# Patient Record
Sex: MALE | Race: WHITE | NOT HISPANIC OR LATINO | Employment: OTHER | URBAN - METROPOLITAN AREA
[De-identification: names, ages, dates, MRNs, and addresses within clinical notes are randomized per-mention and may not be internally consistent; named-entity substitution may affect disease eponyms.]

---

## 2018-05-18 PROBLEM — I10 ESSENTIAL HYPERTENSION: Status: ACTIVE | Noted: 2018-05-18

## 2018-05-18 PROCEDURE — 87088 URINE BACTERIA CULTURE: CPT | Performed by: INTERNAL MEDICINE

## 2018-05-18 PROCEDURE — 87186 SC STD MICRODIL/AGAR DIL: CPT | Performed by: INTERNAL MEDICINE

## 2018-05-18 PROCEDURE — 87086 URINE CULTURE/COLONY COUNT: CPT | Performed by: INTERNAL MEDICINE

## 2018-07-16 PROCEDURE — 87088 URINE BACTERIA CULTURE: CPT | Performed by: FAMILY MEDICINE

## 2018-07-16 PROCEDURE — 87186 SC STD MICRODIL/AGAR DIL: CPT | Performed by: FAMILY MEDICINE

## 2018-07-16 PROCEDURE — 87086 URINE CULTURE/COLONY COUNT: CPT | Performed by: FAMILY MEDICINE

## 2018-08-10 PROCEDURE — 81015 MICROSCOPIC EXAM OF URINE: CPT | Performed by: UROLOGY

## 2018-08-10 PROCEDURE — 87186 SC STD MICRODIL/AGAR DIL: CPT | Performed by: UROLOGY

## 2018-08-10 PROCEDURE — 87086 URINE CULTURE/COLONY COUNT: CPT | Performed by: UROLOGY

## 2018-08-10 PROCEDURE — 87088 URINE BACTERIA CULTURE: CPT | Performed by: UROLOGY

## 2018-12-14 PROCEDURE — 87186 SC STD MICRODIL/AGAR DIL: CPT | Performed by: INTERNAL MEDICINE

## 2018-12-14 PROCEDURE — 87088 URINE BACTERIA CULTURE: CPT | Performed by: INTERNAL MEDICINE

## 2018-12-14 PROCEDURE — 87086 URINE CULTURE/COLONY COUNT: CPT | Performed by: INTERNAL MEDICINE

## 2018-12-14 PROCEDURE — 81001 URINALYSIS AUTO W/SCOPE: CPT | Performed by: INTERNAL MEDICINE

## 2019-01-15 PROCEDURE — 84154 ASSAY OF PSA FREE: CPT | Performed by: UROLOGY

## 2019-01-15 PROCEDURE — 36415 COLL VENOUS BLD VENIPUNCTURE: CPT | Performed by: UROLOGY

## 2019-01-15 PROCEDURE — 84153 ASSAY OF PSA TOTAL: CPT | Performed by: UROLOGY

## 2019-02-22 PROCEDURE — 87186 SC STD MICRODIL/AGAR DIL: CPT | Performed by: INTERNAL MEDICINE

## 2019-02-22 PROCEDURE — 87086 URINE CULTURE/COLONY COUNT: CPT | Performed by: INTERNAL MEDICINE

## 2019-02-22 PROCEDURE — 87088 URINE BACTERIA CULTURE: CPT | Performed by: INTERNAL MEDICINE

## 2019-03-19 PROCEDURE — 81015 MICROSCOPIC EXAM OF URINE: CPT | Performed by: UROLOGY

## 2019-03-19 PROCEDURE — 87086 URINE CULTURE/COLONY COUNT: CPT | Performed by: UROLOGY

## 2019-05-02 PROCEDURE — 87086 URINE CULTURE/COLONY COUNT: CPT | Performed by: UROLOGY

## 2019-05-02 PROCEDURE — 87088 URINE BACTERIA CULTURE: CPT | Performed by: UROLOGY

## 2019-05-02 PROCEDURE — 81001 URINALYSIS AUTO W/SCOPE: CPT | Performed by: UROLOGY

## 2019-05-02 PROCEDURE — 87186 SC STD MICRODIL/AGAR DIL: CPT | Performed by: UROLOGY

## 2019-06-17 PROCEDURE — 87186 SC STD MICRODIL/AGAR DIL: CPT | Performed by: UROLOGY

## 2019-06-17 PROCEDURE — 87086 URINE CULTURE/COLONY COUNT: CPT | Performed by: UROLOGY

## 2019-06-17 PROCEDURE — 87088 URINE BACTERIA CULTURE: CPT | Performed by: UROLOGY

## 2019-06-17 PROCEDURE — 81001 URINALYSIS AUTO W/SCOPE: CPT | Performed by: UROLOGY

## 2019-07-06 PROBLEM — F17.200 SMOKER: Status: ACTIVE | Noted: 2019-07-06

## 2019-07-06 PROBLEM — E78.5 HYPERLIPIDEMIA, UNSPECIFIED HYPERLIPIDEMIA TYPE: Status: ACTIVE | Noted: 2019-07-06

## 2019-07-08 PROCEDURE — 81001 URINALYSIS AUTO W/SCOPE: CPT | Performed by: INTERNAL MEDICINE

## 2024-01-02 ENCOUNTER — HOSPITAL ENCOUNTER (INPATIENT)
Facility: HOSPITAL | Age: 79
LOS: 6 days | Discharge: HOME | End: 2024-01-08
Attending: EMERGENCY MEDICINE | Admitting: STUDENT IN AN ORGANIZED HEALTH CARE EDUCATION/TRAINING PROGRAM
Payer: MEDICARE

## 2024-01-02 ENCOUNTER — APPOINTMENT (OUTPATIENT)
Dept: GENERAL RADIOLOGY | Facility: HOSPITAL | Age: 79
End: 2024-01-02
Payer: MEDICARE

## 2024-01-02 DIAGNOSIS — J18.9 PNEUMONIA OF BOTH LUNGS DUE TO INFECTIOUS ORGANISM, UNSPECIFIED PART OF LUNG: ICD-10-CM

## 2024-01-02 DIAGNOSIS — J44.0 COPD WITH PNEUMONIA: Primary | ICD-10-CM

## 2024-01-02 DIAGNOSIS — T17.800A MULTIPLE TRACHEOBRONCHIAL MUCUS PLUGS: ICD-10-CM

## 2024-01-02 DIAGNOSIS — J18.9 COPD WITH PNEUMONIA: Primary | ICD-10-CM

## 2024-01-02 LAB
ALBUMIN SERPL-MCNC: 3.5 G/DL (ref 3.5–5.2)
ALBUMIN/GLOB SERPL: 0.8 G/DL
ALP SERPL-CCNC: 273 U/L (ref 39–117)
ALT SERPL W P-5'-P-CCNC: 56 U/L (ref 1–41)
ANION GAP SERPL CALCULATED.3IONS-SCNC: 7.8 MMOL/L (ref 5–15)
AST SERPL-CCNC: 37 U/L (ref 1–40)
BASOPHILS # BLD MANUAL: 0.3 10*3/MM3 (ref 0–0.2)
BASOPHILS NFR BLD MANUAL: 1 % (ref 0–1.5)
BILIRUB SERPL-MCNC: 0.7 MG/DL (ref 0–1.2)
BUN SERPL-MCNC: 21 MG/DL (ref 8–23)
BUN/CREAT SERPL: 21.6 (ref 7–25)
CALCIUM SPEC-SCNC: 9 MG/DL (ref 8.6–10.5)
CHLORIDE SERPL-SCNC: 96 MMOL/L (ref 98–107)
CO2 SERPL-SCNC: 33.2 MMOL/L (ref 22–29)
CREAT SERPL-MCNC: 0.97 MG/DL (ref 0.76–1.27)
D-LACTATE SERPL-SCNC: 1.5 MMOL/L (ref 0.5–2)
DEPRECATED RDW RBC AUTO: 48.2 FL (ref 37–54)
EGFRCR SERPLBLD CKD-EPI 2021: 79.9 ML/MIN/1.73
ERYTHROCYTE [DISTWIDTH] IN BLOOD BY AUTOMATED COUNT: 13.8 % (ref 12.3–15.4)
FLUAV SUBTYP SPEC NAA+PROBE: NOT DETECTED
FLUBV RNA ISLT QL NAA+PROBE: NOT DETECTED
GEN 5 2HR TROPONIN T REFLEX: 14 NG/L
GIANT PLATELETS: ABNORMAL
GLOBULIN UR ELPH-MCNC: 4.2 GM/DL
GLUCOSE SERPL-MCNC: 118 MG/DL (ref 65–99)
HCT VFR BLD AUTO: 48.4 % (ref 37.5–51)
HGB BLD-MCNC: 15.4 G/DL (ref 13–17.7)
LYMPHOCYTES # BLD MANUAL: 3.84 10*3/MM3 (ref 0.7–3.1)
LYMPHOCYTES NFR BLD MANUAL: 3 % (ref 5–12)
MCH RBC QN AUTO: 29.9 PG (ref 26.6–33)
MCHC RBC AUTO-ENTMCNC: 31.8 G/DL (ref 31.5–35.7)
MCV RBC AUTO: 94 FL (ref 79–97)
MONOCYTES # BLD: 0.89 10*3/MM3 (ref 0.1–0.9)
MYELOCYTES NFR BLD MANUAL: 1 % (ref 0–0)
NEUTROPHILS # BLD AUTO: 24.19 10*3/MM3 (ref 1.7–7)
NEUTROPHILS NFR BLD MANUAL: 78 % (ref 42.7–76)
NEUTS BAND NFR BLD MANUAL: 4 % (ref 0–5)
NT-PROBNP SERPL-MCNC: 179.3 PG/ML (ref 0–1800)
PLATELET # BLD AUTO: 413 10*3/MM3 (ref 140–450)
PMV BLD AUTO: 12.1 FL (ref 6–12)
POTASSIUM SERPL-SCNC: 3.4 MMOL/L (ref 3.5–5.2)
PROT SERPL-MCNC: 7.7 G/DL (ref 6–8.5)
QT INTERVAL: 374 MS
QTC INTERVAL: 448 MS
RBC # BLD AUTO: 5.15 10*6/MM3 (ref 4.14–5.8)
RBC MORPH BLD: NORMAL
RSV RNA NPH QL NAA+NON-PROBE: NOT DETECTED
SARS-COV-2 RNA RESP QL NAA+PROBE: NOT DETECTED
SODIUM SERPL-SCNC: 137 MMOL/L (ref 136–145)
TROPONIN T DELTA: -1 NG/L
TROPONIN T SERPL HS-MCNC: 15 NG/L
TROPONIN T SERPL HS-MCNC: 15 NG/L
VARIANT LYMPHS NFR BLD MANUAL: 13 % (ref 19.6–45.3)
WBC MORPH BLD: NORMAL
WBC NRBC COR # BLD AUTO: 29.5 10*3/MM3 (ref 3.4–10.8)

## 2024-01-02 PROCEDURE — 25810000003 SODIUM CHLORIDE 0.9 % SOLUTION 250 ML FLEX CONT: Performed by: EMERGENCY MEDICINE

## 2024-01-02 PROCEDURE — 87636 SARSCOV2 & INF A&B AMP PRB: CPT | Performed by: EMERGENCY MEDICINE

## 2024-01-02 PROCEDURE — 80053 COMPREHEN METABOLIC PANEL: CPT | Performed by: EMERGENCY MEDICINE

## 2024-01-02 PROCEDURE — 25010000002 AZITHROMYCIN PER 500 MG: Performed by: EMERGENCY MEDICINE

## 2024-01-02 PROCEDURE — 71045 X-RAY EXAM CHEST 1 VIEW: CPT

## 2024-01-02 PROCEDURE — 93010 ELECTROCARDIOGRAM REPORT: CPT | Performed by: EMERGENCY MEDICINE

## 2024-01-02 PROCEDURE — 25810000003 LACTATED RINGERS PER 1000 ML: Performed by: STUDENT IN AN ORGANIZED HEALTH CARE EDUCATION/TRAINING PROGRAM

## 2024-01-02 PROCEDURE — 93005 ELECTROCARDIOGRAM TRACING: CPT | Performed by: EMERGENCY MEDICINE

## 2024-01-02 PROCEDURE — 87634 RSV DNA/RNA AMP PROBE: CPT | Performed by: EMERGENCY MEDICINE

## 2024-01-02 PROCEDURE — 83880 ASSAY OF NATRIURETIC PEPTIDE: CPT | Performed by: EMERGENCY MEDICINE

## 2024-01-02 PROCEDURE — 83605 ASSAY OF LACTIC ACID: CPT | Performed by: EMERGENCY MEDICINE

## 2024-01-02 PROCEDURE — 25010000002 CEFTRIAXONE PER 250 MG: Performed by: EMERGENCY MEDICINE

## 2024-01-02 PROCEDURE — 87040 BLOOD CULTURE FOR BACTERIA: CPT | Performed by: EMERGENCY MEDICINE

## 2024-01-02 PROCEDURE — 84484 ASSAY OF TROPONIN QUANT: CPT | Performed by: EMERGENCY MEDICINE

## 2024-01-02 PROCEDURE — 85007 BL SMEAR W/DIFF WBC COUNT: CPT | Performed by: EMERGENCY MEDICINE

## 2024-01-02 PROCEDURE — 99285 EMERGENCY DEPT VISIT HI MDM: CPT | Performed by: EMERGENCY MEDICINE

## 2024-01-02 PROCEDURE — 85025 COMPLETE CBC W/AUTO DIFF WBC: CPT | Performed by: EMERGENCY MEDICINE

## 2024-01-02 PROCEDURE — 25010000002 CEFTRIAXONE PER 250 MG: Performed by: STUDENT IN AN ORGANIZED HEALTH CARE EDUCATION/TRAINING PROGRAM

## 2024-01-02 PROCEDURE — 36415 COLL VENOUS BLD VENIPUNCTURE: CPT

## 2024-01-02 PROCEDURE — 99285 EMERGENCY DEPT VISIT HI MDM: CPT

## 2024-01-02 RX ORDER — AMLODIPINE BESYLATE 5 MG/1
5 TABLET ORAL DAILY
COMMUNITY

## 2024-01-02 RX ORDER — SODIUM CHLORIDE 9 MG/ML
40 INJECTION, SOLUTION INTRAVENOUS AS NEEDED
Status: DISCONTINUED | OUTPATIENT
Start: 2024-01-02 | End: 2024-01-08 | Stop reason: HOSPADM

## 2024-01-02 RX ORDER — POTASSIUM CHLORIDE 20 MEQ/1
40 TABLET, EXTENDED RELEASE ORAL EVERY 4 HOURS
Status: COMPLETED | OUTPATIENT
Start: 2024-01-03 | End: 2024-01-03

## 2024-01-02 RX ORDER — METOPROLOL SUCCINATE 50 MG/1
50 TABLET, EXTENDED RELEASE ORAL DAILY
COMMUNITY

## 2024-01-02 RX ORDER — BISACODYL 10 MG
10 SUPPOSITORY, RECTAL RECTAL DAILY PRN
Status: DISCONTINUED | OUTPATIENT
Start: 2024-01-02 | End: 2024-01-08 | Stop reason: HOSPADM

## 2024-01-02 RX ORDER — ONDANSETRON 2 MG/ML
4 INJECTION INTRAMUSCULAR; INTRAVENOUS EVERY 6 HOURS PRN
Status: DISCONTINUED | OUTPATIENT
Start: 2024-01-02 | End: 2024-01-08 | Stop reason: HOSPADM

## 2024-01-02 RX ORDER — PRAVASTATIN SODIUM 20 MG
20 TABLET ORAL DAILY
COMMUNITY

## 2024-01-02 RX ORDER — AMOXICILLIN 250 MG
2 CAPSULE ORAL 2 TIMES DAILY
Status: DISCONTINUED | OUTPATIENT
Start: 2024-01-02 | End: 2024-01-08 | Stop reason: HOSPADM

## 2024-01-02 RX ORDER — IPRATROPIUM BROMIDE AND ALBUTEROL SULFATE 2.5; .5 MG/3ML; MG/3ML
3 SOLUTION RESPIRATORY (INHALATION)
Status: DISCONTINUED | OUTPATIENT
Start: 2024-01-02 | End: 2024-01-06

## 2024-01-02 RX ORDER — SODIUM CHLORIDE 0.9 % (FLUSH) 0.9 %
10 SYRINGE (ML) INJECTION EVERY 12 HOURS SCHEDULED
Status: DISCONTINUED | OUTPATIENT
Start: 2024-01-02 | End: 2024-01-08 | Stop reason: HOSPADM

## 2024-01-02 RX ORDER — BISACODYL 5 MG/1
5 TABLET, DELAYED RELEASE ORAL DAILY PRN
Status: DISCONTINUED | OUTPATIENT
Start: 2024-01-02 | End: 2024-01-08 | Stop reason: HOSPADM

## 2024-01-02 RX ORDER — ALBUTEROL SULFATE 90 UG/1
2 AEROSOL, METERED RESPIRATORY (INHALATION) EVERY 4 HOURS PRN
COMMUNITY

## 2024-01-02 RX ORDER — FLUTICASONE FUROATE AND VILANTEROL 200; 25 UG/1; UG/1
1 POWDER RESPIRATORY (INHALATION)
COMMUNITY

## 2024-01-02 RX ORDER — NITROGLYCERIN 0.4 MG/1
0.4 TABLET SUBLINGUAL
Status: DISCONTINUED | OUTPATIENT
Start: 2024-01-02 | End: 2024-01-08 | Stop reason: HOSPADM

## 2024-01-02 RX ORDER — BUDESONIDE 0.5 MG/2ML
0.5 INHALANT ORAL
Status: DISCONTINUED | OUTPATIENT
Start: 2024-01-02 | End: 2024-01-08 | Stop reason: HOSPADM

## 2024-01-02 RX ORDER — SODIUM CHLORIDE, SODIUM LACTATE, POTASSIUM CHLORIDE, CALCIUM CHLORIDE 600; 310; 30; 20 MG/100ML; MG/100ML; MG/100ML; MG/100ML
75 INJECTION, SOLUTION INTRAVENOUS CONTINUOUS
Status: DISCONTINUED | OUTPATIENT
Start: 2024-01-02 | End: 2024-01-08 | Stop reason: HOSPADM

## 2024-01-02 RX ORDER — POLYETHYLENE GLYCOL 3350 17 G/17G
17 POWDER, FOR SOLUTION ORAL DAILY PRN
Status: DISCONTINUED | OUTPATIENT
Start: 2024-01-02 | End: 2024-01-08 | Stop reason: HOSPADM

## 2024-01-02 RX ORDER — SODIUM CHLORIDE 0.9 % (FLUSH) 0.9 %
10 SYRINGE (ML) INJECTION AS NEEDED
Status: DISCONTINUED | OUTPATIENT
Start: 2024-01-02 | End: 2024-01-08 | Stop reason: HOSPADM

## 2024-01-02 RX ORDER — ONDANSETRON 4 MG/1
4 TABLET, ORALLY DISINTEGRATING ORAL EVERY 6 HOURS PRN
Status: DISCONTINUED | OUTPATIENT
Start: 2024-01-02 | End: 2024-01-08 | Stop reason: HOSPADM

## 2024-01-02 RX ADMIN — Medication 10 ML: at 22:44

## 2024-01-02 RX ADMIN — CEFTRIAXONE 1000 MG: 1 INJECTION, POWDER, FOR SOLUTION INTRAMUSCULAR; INTRAVENOUS at 23:48

## 2024-01-02 RX ADMIN — CEFTRIAXONE 1000 MG: 1 INJECTION, POWDER, FOR SOLUTION INTRAMUSCULAR; INTRAVENOUS at 18:20

## 2024-01-02 RX ADMIN — AZITHROMYCIN MONOHYDRATE 500 MG: 500 INJECTION, POWDER, LYOPHILIZED, FOR SOLUTION INTRAVENOUS at 18:56

## 2024-01-02 RX ADMIN — SODIUM CHLORIDE, POTASSIUM CHLORIDE, SODIUM LACTATE AND CALCIUM CHLORIDE 75 ML/HR: 600; 310; 30; 20 INJECTION, SOLUTION INTRAVENOUS at 22:42

## 2024-01-02 NOTE — ED NOTES
Pt resting in bed, appears to be in NAD.  Pt on continuous cardiac monitor, automatic BP cuff, and pulse ox.

## 2024-01-02 NOTE — FSED PROVIDER NOTE
Subjective   History of Present Illness  78-year-old  male with a history of tobacco use, COPD, not oxygen dependent, presents emerged department for shortness of breath for last 12 days.  Patient here with daughter who reports dyspnea on exertion and worsening fatigue.  Patient denies any chest pain.  No report of any documented fever.  Daughter does report family members with similar symptoms. No abdominal pain, nausea, vomiting, diarrhea, urinary symptoms.  No lower extremity edema.  No history of CHF per patient and daughter.    Review of Systems   All other systems reviewed and are negative.      History reviewed. No pertinent past medical history.    No Known Allergies    History reviewed. No pertinent surgical history.    History reviewed. No pertinent family history.    Social History     Socioeconomic History    Marital status:    Tobacco Use    Smoking status: Every Day     Types: Cigarettes    Smokeless tobacco: Never   Substance and Sexual Activity    Alcohol use: Not Currently    Drug use: Never    Sexual activity: Defer           Objective   Physical Exam  Vitals and nursing note reviewed.   Constitutional:       General: He is not in acute distress.     Appearance: Normal appearance. He is normal weight.   HENT:      Head: Normocephalic and atraumatic.      Right Ear: External ear normal.      Left Ear: External ear normal.      Nose: Nose normal.      Mouth/Throat:      Mouth: Mucous membranes are moist.      Pharynx: Oropharynx is clear.   Eyes:      Conjunctiva/sclera: Conjunctivae normal.      Pupils: Pupils are equal, round, and reactive to light.   Cardiovascular:      Rate and Rhythm: Normal rate.      Pulses: Normal pulses.   Pulmonary:      Comments: Diminished breath sounds at the bases, left greater than right  Abdominal:      General: Abdomen is flat.   Musculoskeletal:         General: Normal range of motion.      Cervical back: Normal range of motion. No rigidity.       Right lower leg: No edema.      Left lower leg: No edema.   Skin:     General: Skin is warm.      Capillary Refill: Capillary refill takes less than 2 seconds.      Findings: No rash.   Neurological:      General: No focal deficit present.      Mental Status: He is alert.         Procedures           ED Course  ED Course as of 01/02/24 1744 Tue Jan 02, 2024 1743 Patient reevaluated, breathing improved.  No signs of CHF.  Will treat as community-acquired pneumonia with COPD exacerbation.  Case discussed with Dr. Toure, who accepts.  [CB]      ED Course User Index  [CB] Fransico Wright MD                                           Medical Decision Making  Generalized weakness, dyspnea on exertion, shortness of breath, and a 78-year-old  male with history of COPD.  Patient hypoxic on room air started on oxygen.  Diminished breath sounds on the left.  Will start with steroids, bronchodilators, get cardiac, pneumonia workup and disposition accordingly.    Problems Addressed:  COPD with pneumonia: complicated acute illness or injury    Amount and/or Complexity of Data Reviewed  Labs: ordered.  Radiology: ordered.  ECG/medicine tests: ordered.    Risk  Decision regarding hospitalization.    Critical Care  Total time providing critical care: 45 minutes    Critical care time is separate from procedure time    Final diagnoses:   COPD with pneumonia       ED Disposition  ED Disposition       ED Disposition   Decision to Admit    Condition   --    Comment   Level of Care: Telemetry [5]   Admitting Physician: KATHLEEN TOURE [[id number]]                 No follow-up provider specified.       Medication List      No changes were made to your prescriptions during this visit.

## 2024-01-02 NOTE — ED NOTES
Pt states has hx of COPD.  States that he hasn't been feeling well for about 10 days, states that he is having increased SOA.  Pt does not wear O2 at home.  Pt currently on 2L O2 with O2 sat 92%.

## 2024-01-02 NOTE — ED NOTES
Pt moved to room 8.  Pt updated on POC.  Pt denies needs/wants at this time.  Pt placed on continuous cardiac monitor, automatic BP cuff, and pulse ox.  Pt on 2L O2 via NC

## 2024-01-03 ENCOUNTER — APPOINTMENT (OUTPATIENT)
Dept: CT IMAGING | Facility: HOSPITAL | Age: 79
End: 2024-01-03
Payer: MEDICARE

## 2024-01-03 LAB
ANION GAP SERPL CALCULATED.3IONS-SCNC: 14 MMOL/L (ref 5–15)
BUN SERPL-MCNC: 26 MG/DL (ref 8–23)
BUN/CREAT SERPL: 27.1 (ref 7–25)
CALCIUM SPEC-SCNC: 8.8 MG/DL (ref 8.6–10.5)
CHLORIDE SERPL-SCNC: 97 MMOL/L (ref 98–107)
CO2 SERPL-SCNC: 28 MMOL/L (ref 22–29)
CREAT SERPL-MCNC: 0.96 MG/DL (ref 0.76–1.27)
DEPRECATED RDW RBC AUTO: 46.8 FL (ref 37–54)
EGFRCR SERPLBLD CKD-EPI 2021: 80.9 ML/MIN/1.73
ERYTHROCYTE [DISTWIDTH] IN BLOOD BY AUTOMATED COUNT: 14.4 % (ref 12.3–15.4)
GLUCOSE SERPL-MCNC: 109 MG/DL (ref 65–99)
HCT VFR BLD AUTO: 42.1 % (ref 37.5–51)
HGB BLD-MCNC: 13.8 G/DL (ref 13–17.7)
INR PPP: 1.14 (ref 0.93–1.1)
L PNEUMO1 AG UR QL IA: NEGATIVE
MCH RBC QN AUTO: 30.4 PG (ref 26.6–33)
MCHC RBC AUTO-ENTMCNC: 32.8 G/DL (ref 31.5–35.7)
MCV RBC AUTO: 92.5 FL (ref 79–97)
PLATELET # BLD AUTO: 397 10*3/MM3 (ref 140–450)
PMV BLD AUTO: 10.1 FL (ref 6–12)
POTASSIUM SERPL-SCNC: 3.6 MMOL/L (ref 3.5–5.2)
PROTHROMBIN TIME: 12.3 SECONDS (ref 9.6–11.7)
RBC # BLD AUTO: 4.56 10*6/MM3 (ref 4.14–5.8)
S PNEUM AG SPEC QL LA: NEGATIVE
SODIUM SERPL-SCNC: 139 MMOL/L (ref 136–145)
WBC NRBC COR # BLD AUTO: 28.8 10*3/MM3 (ref 3.4–10.8)

## 2024-01-03 PROCEDURE — 97161 PT EVAL LOW COMPLEX 20 MIN: CPT

## 2024-01-03 PROCEDURE — 94761 N-INVAS EAR/PLS OXIMETRY MLT: CPT

## 2024-01-03 PROCEDURE — 80048 BASIC METABOLIC PNL TOTAL CA: CPT | Performed by: STUDENT IN AN ORGANIZED HEALTH CARE EDUCATION/TRAINING PROGRAM

## 2024-01-03 PROCEDURE — 94799 UNLISTED PULMONARY SVC/PX: CPT

## 2024-01-03 PROCEDURE — 85027 COMPLETE CBC AUTOMATED: CPT | Performed by: STUDENT IN AN ORGANIZED HEALTH CARE EDUCATION/TRAINING PROGRAM

## 2024-01-03 PROCEDURE — 85610 PROTHROMBIN TIME: CPT | Performed by: STUDENT IN AN ORGANIZED HEALTH CARE EDUCATION/TRAINING PROGRAM

## 2024-01-03 PROCEDURE — 87449 NOS EACH ORGANISM AG IA: CPT | Performed by: STUDENT IN AN ORGANIZED HEALTH CARE EDUCATION/TRAINING PROGRAM

## 2024-01-03 PROCEDURE — 25810000003 SODIUM CHLORIDE 0.9 % SOLUTION 250 ML FLEX CONT: Performed by: STUDENT IN AN ORGANIZED HEALTH CARE EDUCATION/TRAINING PROGRAM

## 2024-01-03 PROCEDURE — 87899 AGENT NOS ASSAY W/OPTIC: CPT | Performed by: STUDENT IN AN ORGANIZED HEALTH CARE EDUCATION/TRAINING PROGRAM

## 2024-01-03 PROCEDURE — 25010000002 CEFTRIAXONE PER 250 MG: Performed by: STUDENT IN AN ORGANIZED HEALTH CARE EDUCATION/TRAINING PROGRAM

## 2024-01-03 PROCEDURE — 71275 CT ANGIOGRAPHY CHEST: CPT

## 2024-01-03 PROCEDURE — 25510000001 IOPAMIDOL PER 1 ML: Performed by: STUDENT IN AN ORGANIZED HEALTH CARE EDUCATION/TRAINING PROGRAM

## 2024-01-03 PROCEDURE — 25010000002 AZITHROMYCIN PER 500 MG: Performed by: STUDENT IN AN ORGANIZED HEALTH CARE EDUCATION/TRAINING PROGRAM

## 2024-01-03 PROCEDURE — 94664 DEMO&/EVAL PT USE INHALER: CPT

## 2024-01-03 RX ORDER — AMLODIPINE BESYLATE 5 MG/1
5 TABLET ORAL DAILY
Status: DISCONTINUED | OUTPATIENT
Start: 2024-01-03 | End: 2024-01-07

## 2024-01-03 RX ORDER — ATORVASTATIN CALCIUM 10 MG/1
10 TABLET, FILM COATED ORAL DAILY
Status: DISCONTINUED | OUTPATIENT
Start: 2024-01-03 | End: 2024-01-08 | Stop reason: HOSPADM

## 2024-01-03 RX ORDER — METOPROLOL SUCCINATE 50 MG/1
50 TABLET, EXTENDED RELEASE ORAL DAILY
Status: DISCONTINUED | OUTPATIENT
Start: 2024-01-03 | End: 2024-01-08 | Stop reason: HOSPADM

## 2024-01-03 RX ADMIN — IOPAMIDOL 100 ML: 755 INJECTION, SOLUTION INTRAVENOUS at 01:50

## 2024-01-03 RX ADMIN — IPRATROPIUM BROMIDE AND ALBUTEROL SULFATE 3 ML: .5; 3 SOLUTION RESPIRATORY (INHALATION) at 11:38

## 2024-01-03 RX ADMIN — IPRATROPIUM BROMIDE AND ALBUTEROL SULFATE 3 ML: .5; 3 SOLUTION RESPIRATORY (INHALATION) at 20:15

## 2024-01-03 RX ADMIN — METOPROLOL SUCCINATE 50 MG: 50 TABLET, EXTENDED RELEASE ORAL at 13:54

## 2024-01-03 RX ADMIN — AMLODIPINE BESYLATE 5 MG: 5 TABLET ORAL at 13:54

## 2024-01-03 RX ADMIN — POTASSIUM CHLORIDE 40 MEQ: 1500 TABLET, EXTENDED RELEASE ORAL at 00:25

## 2024-01-03 RX ADMIN — CEFTRIAXONE 2000 MG: 2 INJECTION, POWDER, FOR SOLUTION INTRAMUSCULAR; INTRAVENOUS at 18:49

## 2024-01-03 RX ADMIN — ATORVASTATIN CALCIUM 10 MG: 10 TABLET, FILM COATED ORAL at 13:54

## 2024-01-03 RX ADMIN — IPRATROPIUM BROMIDE AND ALBUTEROL SULFATE 3 ML: .5; 3 SOLUTION RESPIRATORY (INHALATION) at 07:34

## 2024-01-03 RX ADMIN — BUDESONIDE INHALATION 0.5 MG: 0.5 SUSPENSION RESPIRATORY (INHALATION) at 07:37

## 2024-01-03 RX ADMIN — AZITHROMYCIN MONOHYDRATE 500 MG: 500 INJECTION, POWDER, LYOPHILIZED, FOR SOLUTION INTRAVENOUS at 17:40

## 2024-01-03 RX ADMIN — POTASSIUM CHLORIDE 40 MEQ: 1500 TABLET, EXTENDED RELEASE ORAL at 05:23

## 2024-01-03 RX ADMIN — BUDESONIDE INHALATION 0.5 MG: 0.5 SUSPENSION RESPIRATORY (INHALATION) at 20:19

## 2024-01-03 RX ADMIN — IPRATROPIUM BROMIDE AND ALBUTEROL SULFATE 3 ML: .5; 3 SOLUTION RESPIRATORY (INHALATION) at 15:20

## 2024-01-03 RX ADMIN — DOCUSATE SODIUM AND SENNOSIDES 2 TABLET: 8.6; 5 TABLET, FILM COATED ORAL at 08:03

## 2024-01-03 RX ADMIN — Medication 10 ML: at 08:04

## 2024-01-03 NOTE — CONSULTS
[facility]: [facility]         CONSULT NOTE    Patient Identification:  Lizz Powell  78 y.o.  male  1945  [id number]            Requesting physician: Attending physician    Reason for Consultation: Lung mass      History of Present Illness:  78-year-old male with history of COPD, tobacco smoking who presented 1/2/2024 with complaints of shortness of breath for the last 12 days with fatigue and productive cough.  Chest x-ray showed bilateral airspace opacities with left pleural effusion and adenopathy.  WBCs 28.8, hemoglobin 13.8, urinary antigen for Legionella and strep pneumonia is negative.  CT scan of the chest showed endobronchial debris's with extensive tree-in-bud    Assessment:  Bilateral pneumonia with tree-in-bud pattern and areas of consolidation  Hypoxemia  COPD  Tobacco smoking    Recommendations:  Antibiotics, consider bronchoscopy if not improving  Patient will need follow-up CT scan of the chest in 4-6 weeks to ensure resolution of the infiltrates  Oxygen supplement and titration to maintain saturation 90 to 95%  Currently on 2 L per nasal cannula  Bronchodilators  Inhaled corticosteroids  Smoking cessation counseling  DVT/GI prophylaxis  Check daily labs and correct electrolytes as needed  I personally reviewed the radiological studies      Review of Sytems:  Constitutional: Negative for chills, and fever and positive for malaise/fatigue.   HENT: Negative.    Eyes: Negative.    Cardiovascular: Negative.    Respiratory: Positive for cough and shortness of breath.    Skin: Negative.    Musculoskeletal: Negative.    Gastrointestinal: Negative.    Genitourinary: Negative.    Neurological: Negative.    Psychiatric/Behavioral: Negative.    Past Medical History:  Past Medical History:   Diagnosis Date    COPD (chronic obstructive pulmonary disease)     Hyperlipidemia     Hypertension        Past Surgical History:  History reviewed. No pertinent surgical history.     Home Meds:  Medications  "Prior to Admission   Medication Sig Dispense Refill Last Dose    albuterol sulfate  (90 Base) MCG/ACT inhaler Inhale 2 puffs Every 4 (Four) Hours As Needed for Wheezing.       amLODIPine (NORVASC) 5 MG tablet Take 1 tablet by mouth Daily.       Fluticasone Furoate-Vilanterol (BREO ELLIPTA) 200-25 MCG/ACT inhaler Inhale 1 puff Daily.       metoprolol succinate XL (TOPROL-XL) 50 MG 24 hr tablet Take 1 tablet by mouth Daily.       pravastatin (PRAVACHOL) 20 MG tablet Take 1 tablet by mouth Daily.          Allergies:  No Known Allergies    Social History:   Social History     Socioeconomic History    Marital status:    Tobacco Use    Smoking status: Every Day     Packs/day: .25     Types: Cigarettes    Smokeless tobacco: Never    Tobacco comments:     1 cigarette a day   Substance and Sexual Activity    Alcohol use: Not Currently    Drug use: Never    Sexual activity: Defer       Family History:  History reviewed. No pertinent family history.    Physical Exam:  /68 (BP Location: Right arm, Patient Position: Lying)   Pulse 79   Temp 97.4 °F (36.3 °C) (Oral)   Resp 19   Ht 175.3 cm (69\")   Wt 58.4 kg (128 lb 12.3 oz)   SpO2 94%   BMI 19.02 kg/m²  Body mass index is 19.02 kg/m². 94% 58.4 kg (128 lb 12.3 oz)  General Appearance:  Alert   HEENT:  Normocephalic, without obvious abnormality, Conjunctiva/corneas clear,.   Nares normal, no drainage     Neck:  Supple, symmetrical, trachea midline. No JVD.  Lungs /Chest wall:   Bilateral basal rhonchi, respirations unlabored, symmetrical wall movement.     Heart:  Regular rate and rhythm, S1 S2 normal  Abdomen: Soft, non-tender, no masses, no organomegaly.    Extremities: No edema, no clubbing or cyanosis    LABS:  Lab Results   Component Value Date    CALCIUM 8.8 01/03/2024     Results from last 7 days   Lab Units 01/03/24  0106 01/02/24  1541   SODIUM mmol/L 139 137   POTASSIUM mmol/L 3.6 3.4*   CHLORIDE mmol/L 97* 96*   CO2 mmol/L 28.0 33.2*   BUN " "mg/dL 26* 21   CREATININE mg/dL 0.96 0.97   GLUCOSE mg/dL 109* 118*   CALCIUM mg/dL 8.8 9.0   WBC 10*3/mm3 28.80* 29.50*   HEMOGLOBIN g/dL 13.8 15.4   PLATELETS 10*3/mm3 397 413   ALT (SGPT) U/L  --  56*   AST (SGOT) U/L  --  37   PROBNP pg/mL  --  179.3     Lab Results   Component Value Date    TROPONINT 14 01/02/2024     Results from last 7 days   Lab Units 01/02/24  1755 01/02/24  1541   HSTROP T ng/L 14 15  15         Results from last 7 days   Lab Units 01/02/24  1541   LACTATE mmol/L 1.5         Results from last 7 days   Lab Units 01/02/24  1505 01/02/24  1444   INFLUENZA B PCR   --  Not Detected   INFLUENZA A PCR   --  Not Detected   RSV, PCR  Not Detected  --      Results from last 7 days   Lab Units 01/03/24  0106   INR  1.14*         No results found for: \"TSH\"  Estimated Creatinine Clearance: 52.4 mL/min (by C-G formula based on SCr of 0.96 mg/dL).         Imaging:  Imaging Results (Last 24 Hours)       Procedure Component Value Units Date/Time    CT Angiogram Chest Pulmonary Embolism [[id number]] Collected: 01/03/24 0154     Updated: 01/03/24 0158    Narrative:      CT ANGIOGRAM CHEST PULMONARY EMBOLISM    Date of Exam: 1/3/2024 1:30 AM EST    Indication: Pulmonary embolism (PE) suspected, high prob.    Comparison: Chest radiograph 1/2/2024.    Technique: Axial CT images were obtained of the chest after the uneventful intravenous administration of iodinated contrast utilizing pulmonary embolism protocol.  Sagittal and coronal reconstructions were performed.  Automated exposure control and   iterative reconstruction methods were used.      Findings:  There is diffuse peribronchial thickening. There is segmentally obstructive endobronchial debris in both lungs, most notable in the dependent lower lobes. There is patchy airspace disease in the left lower lobe. There are extensive tree-in-bud nodules   scattered throughout both lungs, most pronounced in the lower lungs. There is no pneumothorax, pleural " effusion or lobar consolidation.    The thyroid, trachea and esophagus appear within normal limits. No pericardial effusion. There is mild reactive mediastinal lymphadenopathy. Heart size is normal. There are mild coronary artery calcifications. There is minimal aortic atherosclerosis.   There is four-vessel arch anatomy. No evidence of pulmonary embolism. Main pulmonary artery is normal diameter.    No acute findings in the superficial soft tissues or within the limited images of the upper abdomen. There are no acute osseous abnormalities or destructive bone lesions. There are mild lower thoracic degenerative changes.      Impression:      Impression:  1.No evidence of pulmonary embolism.  2.There is diffuse peribronchial thickening with segmentally obstructive endobronchial debris and extensive tree-in-bud nodules scattered throughout both lungs. Findings are concerning for pneumonia and infectious bronchiolitis  3.Mild coronary artery calcification.        Electronically Signed: Danilo Alexander MD    1/3/2024 1:56 AM EST    Workstation ID: [id number]    XR Chest 1 View [[id number]] Collected: 01/02/24 1507     Updated: 01/02/24 1512    Narrative:      XR CHEST 1 VW    Date of Exam: 1/2/2024 2:55 PM EST    Indication: cough    Comparison: None available.    Findings:  There are bilateral perihilar airspace and interstitial opacities suggesting edema or atypical infection pattern. There is prominence of the right pulmonary hilum which may be due to adenopathy. This may be reactive given the pulmonary findings.   Attention on follow-up recommended to rule out neoplastic cause. There is a small left pleural effusion. Heart size is at the upper limits of normal. Pulmonary vascularity is difficult to gauge. Aortic vascular calcification is present.      Impression:      Impression:    1. Bilateral perihilar airspace and interstitial opacities, left greater than right, suggesting atypical infection pattern or edema. Small  left pleural effusion.  2. There is prominence of the right pulmonary hilum which may be due to adenopathy. This may be reactive given the pulmonary findings. Attention on follow-up recommended to rule out neoplasm.      Electronically Signed: Brock Ramon MD    1/2/2024 3:10 PM EST    Workstation ID: [id number]              Current Meds:   SCHEDULE  azithromycin, 500 mg, Intravenous, Q24H  budesonide, 0.5 mg, Nebulization, BID - RT  cefTRIAXone (ROCEPHIN) 2,000 mg in sodium chloride 0.9 % 100 mL IVPB, 2,000 mg, Intravenous, Q24H  ipratropium-albuterol, 3 mL, Nebulization, 4x Daily - RT  senna-docusate sodium, 2 tablet, Oral, BID  sodium chloride, 10 mL, Intravenous, Q12H      Infusions  lactated ringers, 75 mL/hr, Last Rate: 75 mL/hr (01/02/24 5972)      PRNs    senna-docusate sodium **AND** polyethylene glycol **AND** bisacodyl **AND** bisacodyl    Calcium Replacement - Follow Nurse / BPA Driven Protocol    Magnesium Standard Dose Replacement - Follow Nurse / BPA Driven Protocol    nitroglycerin    ondansetron ODT **OR** ondansetron    Phosphorus Replacement - Follow Nurse / BPA Driven Protocol    Potassium Replacement - Follow Nurse / BPA Driven Protocol    sodium chloride    sodium chloride        Sheldon Montiel MD  1/3/2024  09:13 EST      Much of this encounter note is an electronic transcription/translation of spoken language to printed text using Dragon Software.

## 2024-01-03 NOTE — ED NOTES
Pt sitting up in bed. IVF running.pt tolerating ABX well. VSS . Call light in reach. Denies any pain at this time.

## 2024-01-03 NOTE — ED NOTES
Pt sitting up in bed. Updated pt on room being ready. EMS is on way. ABX completed. No immediate needs at this time. Call light in reach.

## 2024-01-03 NOTE — PROGRESS NOTES
[facility] MEDICINE SERVICE  DAILY PROGRESS NOTE    NAME: Lizz Powell  : 1945  MRN: [id number]      LOS: 1 day     PROVIDER OF SERVICE: Lisandro Fuchs MD    Chief Complaint: Bilateral pneumonia    Subjective:     Interval History:  History taken from: patient  Patient doing better today reports his breathing is slowly improving.  Still continuing with the supplemental oxygen    Review of Systems:   Review of Systems   Respiratory:  Positive for cough and shortness of breath.        Objective:     Vital Signs  Temp:  [97.4 °F (36.3 °C)-97.9 °F (36.6 °C)] 97.4 °F (36.3 °C)  Heart Rate:  [70-89] 79  Resp:  [16-28] 19  BP: (120-162)/(63-91) 146/68  Flow (L/min):  [1-3] 2   Body mass index is 19.02 kg/m².    Physical Exam  Physical Exam  Vitals and nursing note reviewed.   Constitutional:       General: He is not in acute distress.     Appearance: He is well-developed. He is not diaphoretic.   HENT:      Head: Normocephalic and atraumatic.   Cardiovascular:      Rate and Rhythm: Normal rate.   Pulmonary:      Effort: Pulmonary effort is normal. No respiratory distress.      Breath sounds: No wheezing.   Abdominal:      General: There is no distension.      Palpations: Abdomen is soft.   Musculoskeletal:         General: Normal range of motion.   Skin:     General: Skin is warm and dry.   Neurological:      Mental Status: He is alert.      Cranial Nerves: No cranial nerve deficit.   Psychiatric:         Behavior: Behavior normal.         Thought Content: Thought content normal.         Judgment: Judgment normal.         Scheduled Meds   azithromycin, 500 mg, Intravenous, Q24H  budesonide, 0.5 mg, Nebulization, BID - RT  cefTRIAXone (ROCEPHIN) 2,000 mg in sodium chloride 0.9 % 100 mL IVPB, 2,000 mg, Intravenous, Q24H  ipratropium-albuterol, 3 mL, Nebulization, 4x Daily - RT  senna-docusate sodium, 2 tablet, Oral, BID  sodium chloride, 10 mL, Intravenous, Q12H       PRN Meds     senna-docusate sodium  **AND** polyethylene glycol **AND** bisacodyl **AND** bisacodyl    Calcium Replacement - Follow Nurse / BPA Driven Protocol    Magnesium Standard Dose Replacement - Follow Nurse / BPA Driven Protocol    nitroglycerin    ondansetron ODT **OR** ondansetron    Phosphorus Replacement - Follow Nurse / BPA Driven Protocol    Potassium Replacement - Follow Nurse / BPA Driven Protocol    sodium chloride    sodium chloride   Infusions  lactated ringers, 75 mL/hr, Last Rate: 75 mL/hr (01/02/24 5072)          Diagnostic Data    Results from last 7 days   Lab Units 01/03/24  0106 01/02/24  1541   WBC 10*3/mm3 28.80* 29.50*   HEMOGLOBIN g/dL 13.8 15.4   HEMATOCRIT % 42.1 48.4   PLATELETS 10*3/mm3 397 413   GLUCOSE mg/dL 109* 118*   CREATININE mg/dL 0.96 0.97   BUN mg/dL 26* 21   SODIUM mmol/L 139 137   POTASSIUM mmol/L 3.6 3.4*   AST (SGOT) U/L  --  37   ALT (SGPT) U/L  --  56*   ALK PHOS U/L  --  273*   BILIRUBIN mg/dL  --  0.7   ANION GAP mmol/L 14.0 7.8       CT Angiogram Chest Pulmonary Embolism    Result Date: 1/3/2024  Impression: 1.No evidence of pulmonary embolism. 2.There is diffuse peribronchial thickening with segmentally obstructive endobronchial debris and extensive tree-in-bud nodules scattered throughout both lungs. Findings are concerning for pneumonia and infectious bronchiolitis 3.Mild coronary artery calcification. Electronically Signed: Danilo Alexander MD  1/3/2024 1:56 AM EST  Workstation ID: [id number]    XR Chest 1 View    Result Date: 1/2/2024  Impression: 1. Bilateral perihilar airspace and interstitial opacities, left greater than right, suggesting atypical infection pattern or edema. Small left pleural effusion. 2. There is prominence of the right pulmonary hilum which may be due to adenopathy. This may be reactive given the pulmonary findings. Attention on follow-up recommended to rule out neoplasm. Electronically Signed: Brock Ramon MD  1/2/2024 3:10 PM EST  Workstation ID: [id number]       I reviewed  the patient's new clinical results.    Assessment/Plan:     Active and Resolved Problems  Active Hospital Problems    Diagnosis  POA    **Bilateral pneumonia [J18.9]  Yes      Resolved Hospital Problems   No resolved problems to display.     #Multi-focal Pneumonia  #Respiratory insufficiency    - Patient 87% on Room air at JR, placed on 2L NC    - CXR shows bilateral airspace opacities L>R and small left pleural effusion with adenopathy.    - CT PE     - covid/flu negative    - strep, legionella, sputum    - blood cultures    - Rocephin 2g IV daily    - Azithromycin 500mg IV x 3 days    - pulm consulted, suspect will need bronch to rule out neoplasm    - NPO    - LR @ 75/hr    - Lactate 1.5, monitor    - HS troponin 15 > 14, doubtful ACS    - proBNP 179.3, doubt fluid overload  Pulmonology also managing, we will continue to monitor continue to follow cultures.     #COPD    - Duoneb QID    - Pulmicort BID    - hold off IV steroids at this time  Continue with nebulizer treatments     #Hypokalemia    - replace per protocol     #Tobacco use disorder    - cessation recommended      DVT prophylaxis:  Mechanical DVT prophylaxis orders are present.     Code status is   Code Status and Medical Interventions:   Ordered at: 01/02/24 2120     Code Status (Patient has no pulse and is not breathing):    CPR (Attempt to Resuscitate)     Medical Interventions (Patient has pulse or is breathing):    Full Support       Plan for disposition: Home in 3-4 days    Time: 30 minutes    Signature: Electronically signed by Lisandro Fuchs MD, 01/03/24, 09:25 EST.  [facility] Hospitalist Team

## 2024-01-03 NOTE — THERAPY EVALUATION
Patient Name: Lizz Powell  : 1945    MRN: [id number]                              Today's Date: 1/3/2024       Admit Date: 2024    Visit Dx:     ICD-10-CM ICD-9-CM   1. COPD with pneumonia  J44.0 496    J18.9 486     Patient Active Problem List   Diagnosis    Bilateral pneumonia     Past Medical History:   Diagnosis Date    COPD (chronic obstructive pulmonary disease)     Hyperlipidemia     Hypertension      History reviewed. No pertinent surgical history.   General Information       Row Name 24 1455          Physical Therapy Time and Intention    Document Type evaluation  -OD     Mode of Treatment physical therapy  -OD       Row Name 24 1455          General Information    Patient Profile Reviewed yes  -OD     Prior Level of Function independent:  -OD     Barriers to Rehab none identified  -OD       Row Name 24 1455          Living Environment    People in Home alone  -OD       Row Name 24 1455          Cognition    Orientation Status (Cognition) oriented x 4  -OD       Row Name 24 1455          Safety Issues, Functional Mobility    Safety Issues Affecting Function (Mobility) insight into deficits/self-awareness  -OD     Impairments Affecting Function (Mobility) endurance/activity tolerance  -OD               User Key  (r) = Recorded By, (t) = Taken By, (c) = Cosigned By      Initials Name Provider Type    OD La Ramos PT Physical Therapist                   Mobility       Row Name 24 1455          Bed Mobility    Bed Mobility bed mobility (all) activities  -OD     All Activities, Riley (Bed Mobility) modified independence  -OD     Assistive Device (Bed Mobility) bed rails  -OD       Row Name 24 1455          Bed-Chair Transfer    Bed-Chair Riley (Transfers) supervision  -OD       Row Name 24 1455          Sit-Stand Transfer    Sit-Stand Riley (Transfers) supervision  -OD       Row Name 24 1455          Gait/Stairs  (Locomotion)    [place] Level (Gait) supervision  -OD     Distance in Feet (Gait) 1 x 15 ft, 1 x 50 ft  -OD     Deviations/Abnormal Patterns (Gait) gait speed decreased  -OD               User Key  (r) = Recorded By, (t) = Taken By, (c) = Cosigned By      Initials Name Provider Type    OD La Ramos PT Physical Therapist                   Obj/Interventions       Row Name 01/03/24 1456          Range of Motion Comprehensive    General Range of Motion no range of motion deficits identified  -OD       Row Name 01/03/24 1456          Strength Comprehensive (MMT)    General Manual Muscle Testing (MMT) Assessment no strength deficits identified  -OD       Row Name 01/03/24 1456          Balance    Balance Interventions sitting;standing;minimal challenge  -OD       Row Name 01/03/24 1456          Sensory Assessment (Somatosensory)    Sensory Assessment (Somatosensory) sensation intact  -OD               User Key  (r) = Recorded By, (t) = Taken By, (c) = Cosigned By      Initials Name Provider Type    OD La Ramos PT Physical Therapist                   Goals/Plan    No documentation.                  Clinical Impression       Row Name 01/03/24 1456          Pain    Pretreatment Pain Rating 0/10 - no pain  -OD     Posttreatment Pain Rating 0/10 - no pain  -OD       Row Name 01/03/24 1456          Plan of Care Review    Plan of Care Reviewed With patient;daughter  -OD     Progress improving  -OD     Outcome Evaluation Lizz Powell is a 77 y/o M admitted to [facility] on 1/2/24 for dyspnea on exertion. Chest X-ray shows bilat air space opacities consistent with multi-focal PNA. At baseline, pt lives by himself in [place], he is in town for the holidays visiting his daughter. Pt is typically indep with ADLs, mobility, and driving, and he denies use of AD. Pt reports he is already set up with pulmonary rehab in the future d/t having COPD. Pt currently is on 2L O2 via NC and was supervision with mobility. Pt able to  ambulate in total 65 ft with O2 saturation WNL. Pt near baseline function and would benefit from continued pulmonary rehab upon d/c, other than that safe to mobilize through LOS and d/c home with his dtr. PT will sign off at this time, re-consult with any changes.  -OD       Row Name 01/03/24 1456          Therapy Assessment/Plan (PT)    Criteria for Skilled Interventions Met (PT) no;no problems identified which require skilled intervention  -OD     Therapy Frequency (PT) evaluation only  -OD       Row Name 01/03/24 1456          Vital Signs    Pre SpO2 (%) 94  -OD     O2 Delivery Pre Treatment nasal cannula  2L  -OD     Intra SpO2 (%) 93  -OD     O2 Delivery Intra Treatment nasal cannula  -OD     Post SpO2 (%) 96  -OD     O2 Delivery Post Treatment nasal cannula  -OD     Pre Patient Position Supine  -OD     Intra Patient Position Standing  -OD     Post Patient Position Sitting  -OD       Row Name 01/03/24 1456          Positioning and Restraints    Pre-Treatment Position in bed  -OD     Post Treatment Position chair  -OD     In Chair notified nsg;with family/caregiver;reclined;call light within reach;encouraged to call for assist  -OD               User Key  (r) = Recorded By, (t) = Taken By, (c) = Cosigned By      Initials Name Provider Type    OD La Ramos, PT Physical Therapist                   Outcome Measures       Row Name 01/03/24 1459 01/03/24 0800       How much help from another person do you currently need...    Turning from your back to your side while in flat bed without using bedrails? 4  -OD 4  -AY    Moving from lying on back to sitting on the side of a flat bed without bedrails? 4  -OD 4  -AY    Moving to and from a bed to a chair (including a wheelchair)? 4  -OD 4  -AY    Standing up from a chair using your arms (e.g., wheelchair, bedside chair)? 4  -OD 4  -AY    Climbing 3-5 steps with a railing? 4  -OD 4  -AY    To walk in hospital room? 4  -OD 4  -AY    AM-PAC 6 Clicks Score (PT) 24  -OD  24  -AY    Highest Level of Mobility Goal 8 --> Walked 250 feet or more  -OD 8 --> Walked 250 feet or more  -AY      Row Name 01/03/24 1459          Functional Assessment    Outcome Measure Options AM-PAC 6 Clicks Basic Mobility (PT)  -OD               User Key  (r) = Recorded By, (t) = Taken By, (c) = Cosigned By      Initials Name Provider Type    AY Chanel Jolley, RN Registered Nurse    OD La Ramos, PT Physical Therapist                                 Physical Therapy Education       Title: PT OT SLP Therapies (Done)       Topic: Physical Therapy (Done)       Point: Mobility training (Done)       Learning Progress Summary             Patient Acceptance, E, VU by OD at 1/3/2024 1459                         Point: Home exercise program (Done)       Learning Progress Summary             Patient Acceptance, E, VU by OD at 1/3/2024 1459                         Point: Body mechanics (Done)       Learning Progress Summary             Patient Acceptance, E, VU by OD at 1/3/2024 1459                         Point: Precautions (Done)       Learning Progress Summary             Patient Acceptance, E, VU by OD at 1/3/2024 1459                                         User Key       Initials Effective Dates Name Provider Type Discipline    OD 05/11/23 -  La Ramos, TYRA Physical Therapist PT                  PT Recommendation and Plan     Plan of Care Reviewed With: patient, daughter  Progress: improving  Outcome Evaluation: Lizz Powell is a 79 y/o M admitted to [facility] on 1/2/24 for dyspnea on exertion. Chest X-ray shows bilat air space opacities consistent with multi-focal PNA. At baseline, pt lives by himself in [place], he is in town for the holidays visiting his daughter. Pt is typically indep with ADLs, mobility, and driving, and he denies use of AD. Pt reports he is already set up with pulmonary rehab in the future d/t having COPD. Pt currently is on 2L O2 via NC and was supervision with mobility. Pt able to  ambulate in total 65 ft with O2 saturation WNL. Pt near baseline function and would benefit from continued pulmonary rehab upon d/c, other than that safe to mobilize through LOS and d/c home with his dtr. PT will sign off at this time, re-consult with any changes.     Time Calculation:         PT Charges       Row Name 01/03/24 1459             Time Calculation    Start Time 1338  -OD      Stop Time 1400  -OD      Time Calculation (min) 22 min  -OD      PT Received On 01/03/24  -OD         Time Calculation- PT    Total Timed Code Minutes- PT 0 minute(s)  -OD                User Key  (r) = Recorded By, (t) = Taken By, (c) = Cosigned By      Initials Name Provider Type    OD La Ramos, PT Physical Therapist                  Therapy Charges for Today       Code Description Service Date Service Provider Modifiers Qty    [id number] HC PT EVAL LOW COMPLEXITY 4 1/3/2024 La Ramos, PT GP 1            PT G-Codes  Outcome Measure Options: AM-PAC 6 Clicks Basic Mobility (PT)  AM-PAC 6 Clicks Score (PT): 24  PT Discharge Summary  Anticipated Discharge Disposition (PT): home with assist    La Ramos PT  1/3/2024

## 2024-01-03 NOTE — PLAN OF CARE
Goal Outcome Evaluation:  Plan of Care Reviewed With: patient, daughter        Progress: improving  Outcome Evaluation: Lizz Powell is a 79 y/o M admitted to [facility] on 1/2/24 for dyspnea on exertion. Chest X-ray shows bilat air space opacities consistent with multi-focal PNA. At baseline, pt lives by himself in [place], he is in town for the holidays visiting his daughter. Pt is typically indep with ADLs, mobility, and driving, and he denies use of AD. Pt reports he is already set up with pulmonary rehab in the future d/t having COPD. Pt currently is on 2L O2 via NC and was supervision with mobility. Pt able to ambulate in total 65 ft with O2 saturation WNL. Pt near baseline function and would benefit from continued pulmonary rehab upon d/c, other than that safe to mobilize through LOS and d/c home with his dtr. PT will sign off at this time, re-consult with any changes.      Anticipated Discharge Disposition (PT): home with assist

## 2024-01-03 NOTE — H&P
[facility] Medicine Services  History & Physical    Patient Name: Lizz Powell  : 1945  MRN: [id number]  Primary Care Physician:  Provider, No Known  Date of admission: 2024  Date and Time of Service: 2024 at 2300    Subjective      Chief Complaint: dyspnea    History of Present Illness: Lizz Powell is a 78 y.o. male with a PMHx of COPD and tobacco use disorder who presented to [facility] on 2024 with  dyspnea.  Admits to dyspnea that has been worsening over the past 12 days complicated by productive cough and fatigue.  Denies chest pain, fever, vomiting, diarrhea.  Presneted to  for evalaution.    At , vitals 97.6, HR 74, RR 18, /69, 87% RA.  Labs notable for HS troponin 15 >14, proBNP 179.3, K 3.4, Cl 96, glucose 118, , latate 1.5, wbc 29.5.  CXR shows bilateral airspace opacities L>R and small left pleural effusion with adenopathy.  Transferred to [facility] for admission and pulm evaluation.      Review of Systems  12 point ROS reviewed and negative except as mentioned above      Personal History     History reviewed. No pertinent past medical history.    History reviewed. No pertinent surgical history.    Family History: family history is not on file. Otherwise pertinent FHx was reviewed and not pertinent to current issue.    Social History:  reports that he has been smoking cigarettes. He has never used smokeless tobacco. He reports that he does not currently use alcohol. He reports that he does not use drugs.    Home Medications:  Prior to Admission Medications       None              Allergies:  No Known Allergies    Objective      Vitals:   Temp:  [97.5 °F (36.4 °C)-97.6 °F (36.4 °C)] 97.6 °F (36.4 °C)  Heart Rate:  [72-89] 74  Resp:  [16-28] 18  BP: (130-162)/(63-77) 140/69  Body mass index is 18.61 kg/m².  Physical Exam  Constitutional:       General: He is not in acute distress.     Appearance: He is not toxic-appearing.   HENT:      Head: Normocephalic and  atraumatic.      Nose: Nose normal. No congestion.      Mouth/Throat:      Pharynx: Oropharynx is clear. No oropharyngeal exudate.   Eyes:      General: No scleral icterus.  Cardiovascular:      Rate and Rhythm: Normal rate and regular rhythm.      Heart sounds: No murmur heard.     No friction rub. No gallop.   Pulmonary:      Effort: No respiratory distress.      Breath sounds: Rales present. No wheezing.      Comments: Patient on 2L NC  Abdominal:      General: There is no distension.      Tenderness: There is no abdominal tenderness. There is no guarding.   Musculoskeletal:         General: No swelling, deformity or signs of injury.      Cervical back: Normal range of motion. No rigidity.   Skin:     Coloration: Skin is not jaundiced.      Findings: No bruising or lesion.   Neurological:      General: No focal deficit present.      Mental Status: He is alert and oriented to person, place, and time.      Motor: No weakness.         Diagnostic Data:  Lab Results (last 24 hours)       Procedure Component Value Units Date/Time    Manual Differential [[id number]]  (Abnormal) Collected: 01/02/24 1541    Specimen: Blood Updated: 01/02/24 1957     Neutrophil % 78.0 %      Lymphocyte % 13.0 %      Monocyte % 3.0 %      Basophil % 1.0 %      Bands %  4.0 %      Myelocyte % 1.0 %      Neutrophils Absolute 24.19 10*3/mm3      Lymphocytes Absolute 3.84 10*3/mm3      Monocytes Absolute 0.89 10*3/mm3      Basophils Absolute 0.30 10*3/mm3      RBC Morphology Normal     WBC Morphology Normal     Giant Platelets Slight/1+    High Sensitivity Troponin T 2Hr [[id number]]  (Normal) Collected: 01/02/24 1755    Specimen: Blood Updated: 01/02/24 1818     HS Troponin T 14 ng/L      Troponin T Delta -1 ng/L     Narrative:      High Sensitive Troponin T Reference Range:  <14.0 ng/L- Negative Female for AMI  <22.0 ng/L- Negative Male for AMI  >=14 - Abnormal Female indicating possible myocardial injury.  >=22 - Abnormal Male indicating  possible myocardial injury.   Clinicians would have to utilize clinical acumen, EKG, Troponin, and serial changes to determine if it is an Acute Myocardial Infarction or myocardial injury due to an underlying chronic condition.         High Sensitivity Troponin T [[id number]]  (Normal) Collected: 01/02/24 1541    Specimen: Blood Updated: 01/02/24 1632     HS Troponin T 15 ng/L     Narrative:      High Sensitive Troponin T Reference Range:  <14.0 ng/L- Negative Female for AMI  <22.0 ng/L- Negative Male for AMI  >=14 - Abnormal Female indicating possible myocardial injury.  >=22 - Abnormal Male indicating possible myocardial injury.   Clinicians would have to utilize clinical acumen, EKG, Troponin, and serial changes to determine if it is an Acute Myocardial Infarction or myocardial injury due to an underlying chronic condition.         BNP [[id number]]  (Normal) Collected: 01/02/24 1541    Specimen: Blood Updated: 01/02/24 1632     proBNP 179.3 pg/mL     Narrative:      This assay is used as an aid in the diagnosis of individuals suspected of having heart failure. It can be used as an aid in the diagnosis of acute decompensated heart failure (ADHF) in patients presenting with signs and symptoms of ADHF to the emergency department (ED). In addition, NT-proBNP of <300 pg/mL indicates ADHF is not likely.    Age Range Result Interpretation  NT-proBNP Concentration (pg/mL:      <50             Positive            >450                   Gray                 300-450                    Negative             <300    50-75           Positive            >900                  Gray                300-900                  Negative            <300      >75             Positive            >1800                  Gray                300-1800                  Negative            <300    Single High Sensitivity Troponin T [[id number]]  (Normal) Collected: 01/02/24 1541    Specimen: Blood Updated: 01/02/24 1632     HS Troponin T 15 ng/L      Narrative:      High Sensitive Troponin T Reference Range:  <14.0 ng/L- Negative Female for AMI  <22.0 ng/L- Negative Male for AMI  >=14 - Abnormal Female indicating possible myocardial injury.  >=22 - Abnormal Male indicating possible myocardial injury.   Clinicians would have to utilize clinical acumen, EKG, Troponin, and serial changes to determine if it is an Acute Myocardial Infarction or myocardial injury due to an underlying chronic condition.         Comprehensive Metabolic Panel [[id number]]  (Abnormal) Collected: 01/02/24 1541    Specimen: Blood Updated: 01/02/24 1629     Glucose 118 mg/dL      BUN 21 mg/dL      Creatinine 0.97 mg/dL      Sodium 137 mmol/L      Potassium 3.4 mmol/L      Chloride 96 mmol/L      CO2 33.2 mmol/L      Calcium 9.0 mg/dL      Total Protein 7.7 g/dL      Albumin 3.5 g/dL      ALT (SGPT) 56 U/L      AST (SGOT) 37 U/L      Alkaline Phosphatase 273 U/L      Total Bilirubin 0.7 mg/dL      Globulin 4.2 gm/dL      A/G Ratio 0.8 g/dL      BUN/Creatinine Ratio 21.6     Anion Gap 7.8 mmol/L      eGFR 79.9 mL/min/1.73     Narrative:      GFR Normal >60  Chronic Kidney Disease <60  Kidney Failure <15    The GFR formula is only valid for adults with stable renal function between ages 18 and 70.    Lactic Acid, Plasma [[id number]]  (Normal) Collected: 01/02/24 1541    Specimen: Blood Updated: 01/02/24 1622     Lactate 1.5 mmol/L     CBC & Differential [[id number]]  (Abnormal) Collected: 01/02/24 1541    Specimen: Blood Updated: 01/02/24 1615    Narrative:      The following orders were created for panel order CBC & Differential.  Procedure                               Abnormality         Status                     ---------                               -----------         ------                     CBC Auto Differential[[id number]]        Abnormal            Final result                 Please view results for these tests on the individual orders.    CBC Auto Differential [[id number]]   (Abnormal) Collected: 01/02/24 1541    Specimen: Blood Updated: 01/02/24 1615     WBC 29.50 10*3/mm3      RBC 5.15 10*6/mm3      Hemoglobin 15.4 g/dL      Hematocrit 48.4 %      MCV 94.0 fL      MCH 29.9 pg      MCHC 31.8 g/dL      RDW 13.8 %      RDW-SD 48.2 fl      MPV 12.1 fL      Platelets 413 10*3/mm3     Blood Culture - Blood, Arm, Right [[id number]] Collected: 01/02/24 1606    Specimen: Blood from Arm, Right Updated: 01/02/24 1612    Blood Culture - Blood, Arm, Left [[id number]] Collected: 01/02/24 1541    Specimen: Blood from Arm, Left Updated: 01/02/24 1611    RSV PCR - Swab, Nasopharynx [[id number]]  (Normal) Collected: 01/02/24 1505    Specimen: Swab from Nasopharynx Updated: 01/02/24 1525     RSV, PCR Not Detected    COVID-19 and FLU A/B PCR, 1 HR TAT - Swab, Nasopharynx [[id number]]  (Normal) Collected: 01/02/24 1444    Specimen: Swab from Nasopharynx Updated: 01/02/24 1503     COVID19 Not Detected     Influenza A PCR Not Detected     Influenza B PCR Not Detected    Narrative:      Fact sheet for providers: https://www.fda.gov/media/664312/download    Fact sheet for patients: https://www.fda.gov/media/325041/download    Test performed by PCR.             Imaging Results (Last 24 Hours)       Procedure Component Value Units Date/Time    XR Chest 1 View [[id number]] Collected: 01/02/24 1507     Updated: 01/02/24 1512    Narrative:      XR CHEST 1 VW    Date of Exam: 1/2/2024 2:55 PM EST    Indication: cough    Comparison: None available.    Findings:  There are bilateral perihilar airspace and interstitial opacities suggesting edema or atypical infection pattern. There is prominence of the right pulmonary hilum which may be due to adenopathy. This may be reactive given the pulmonary findings.   Attention on follow-up recommended to rule out neoplastic cause. There is a small left pleural effusion. Heart size is at the upper limits of normal. Pulmonary vascularity is difficult to gauge. Aortic vascular  calcification is present.      Impression:      Impression:    1. Bilateral perihilar airspace and interstitial opacities, left greater than right, suggesting atypical infection pattern or edema. Small left pleural effusion.  2. There is prominence of the right pulmonary hilum which may be due to adenopathy. This may be reactive given the pulmonary findings. Attention on follow-up recommended to rule out neoplasm.      Electronically Signed: Brock Ramon MD    1/2/2024 3:10 PM EST    Workstation ID: [id number]              Assessment & Plan        Active and Resolved Problems  There are no hospital problems to display for this patient.      #Multi-focal Pneumonia  #Respiratory insufficiency    - Patient 87% on Room air at JR, placed on 2L NC    - CXR shows bilateral airspace opacities L>R and small left pleural effusion with adenopathy.    - CT PE     - covid/flu negative    - strep, legionella, sputum    - blood cultures    - Rocephin 2g IV daily    - Azithromycin 500mg IV x 3 days    - pulm consulted, suspect will need bronch to rule out neoplasm    - NPO    - LR @ 75/hr    - Lactate 1.5, monitor    - HS troponin 15 > 14, doubtful ACS    - proBNP 179.3, doubt fluid overload    #COPD    - Duoneb QID    - Pulmicort BID    - hold off IV steroids at this time    #Hypokalemia    - replace per protocol    #Tobacco use disorder    - cessation recommended    DVT prophylaxis: SCDs      Admission Status:  I believe this patient meets inpatient status.    Expected Length of Stay: 2-3 days    PDMP and Medication Dispenses via Sidebar reviewed and consistent with patient reported medications.    I discussed the patient's findings and my recommendations with patient.      Signature:     This document has been electronically signed by Lilly Quintero DO on January 2, 2024 20:44 EST   [facility] Hospitalist Team

## 2024-01-03 NOTE — CASE MANAGEMENT/SOCIAL WORK
Discharge Planning Assessment  [facility]     Patient Name: Lizz Powell  MRN: [id number]  Today's Date: 1/3/2024    Admit Date: 1/2/2024    Plan: Plan to return home in [place], can stay at daughter's home locally if need.   Discharge Needs Assessment       Row Name 01/03/24 1254       Living Environment    People in Home alone    Current Living Arrangements home    Potentially Unsafe Housing Conditions none    Primary Care Provided by self    Provides Primary Care For no one    Family Caregiver if Needed child(krysten), adult    Family Caregiver Names Jenny - daughter    Quality of Family Relationships helpful;involved;supportive    Able to Return to Prior Arrangements yes       Resource/Environmental Concerns    Resource/Environmental Concerns none    Transportation Concerns none       Transition Planning    Patient/Family Anticipates Transition to home    Patient/Family Anticipated Services at Transition none    Transportation Anticipated car, drives self;family or friend will provide       Discharge Needs Assessment    Readmission Within the Last 30 Days no previous admission in last 30 days    Equipment Currently Used at Home none    Concerns to be Addressed discharge planning    Anticipated Changes Related to Illness none    Equipment Needed After Discharge none                   Discharge Plan       Row Name 01/03/24 4273       Plan    Plan Plan to return home in [place], can stay at daughter's home locally if need.    Patient/Family in Agreement with Plan yes    Plan Comments Patient lives at home in [place], [place] alone.  He was visiting daughter here locally for the holidays.  Can return to daughter's home before returning to [place] if need. Patient's daughter, Jenny will transport at discharge. Patient performs IADLs. PCP is Dr Nicola Patel in [place], [place] and does not wish to follow up locally. Patient pharmacy is also in [place] and will use our M2[facility] for now.  Denies financial assistance needs for medication  and/or food. Denies HH and/or rehab needs.  DC Barriers:  O2 2L, WBC 28.8, IV Abxs.                Demographic Summary       Row Name 01/03/24 1254       General Information    Admission Type inpatient    Arrived From emergency department    Required Notices Provided Important Message from Medicare    Referral Source admission list    Reason for Consult discharge planning    Preferred Language English                   Functional Status       Row Name 01/03/24 1254       Functional Status    Usual Activity Tolerance good    Current Activity Tolerance moderate       Functional Status, IADL    Medications independent    Meal Preparation independent    Housekeeping independent    Laundry independent    Shopping independent       Mental Status    General Appearance WDL WDL       Mental Status Summary    Recent Changes in Mental Status/Cognitive Functioning no changes                  Patient Forms       Row Name 01/03/24 1255       Patient Forms    Important Message from Medicare (IMM) Delivered  IMM reviewed, signed, copy given 1/3/24    Delivered to Patient    Method of delivery In person                      SANCHO Santos RN  SIPS/ICU   O: [phone]  C: [phone]  [email or web address]

## 2024-01-03 NOTE — PLAN OF CARE
Goal Outcome Evaluation:Pt  admitted from [facility]. Pt alert and oriented . 02 in place. Pt with congested non productive cough. Pt denies any soa. Potassium being replaced. Pt remains NPO at this time. Plan of care ongoing

## 2024-01-03 NOTE — PLAN OF CARE
Goal Outcome Evaluation:  Plan of Care Reviewed With: patient        Progress: improving  Outcome Evaluation: Pt up in chair, family at bedside. Still needing sputum culture, non productive cough however pt aware, container at bedside. VSS, continues IVFand antibiotics, oxygen on 2L via N/C, no concerns at this time.

## 2024-01-04 PROBLEM — T17.800A MULTIPLE TRACHEOBRONCHIAL MUCUS PLUGS: Status: ACTIVE | Noted: 2024-01-02

## 2024-01-04 LAB
ANION GAP SERPL CALCULATED.3IONS-SCNC: 10 MMOL/L (ref 5–15)
BUN SERPL-MCNC: 19 MG/DL (ref 8–23)
BUN/CREAT SERPL: 26 (ref 7–25)
CALCIUM SPEC-SCNC: 8.6 MG/DL (ref 8.6–10.5)
CHLORIDE SERPL-SCNC: 102 MMOL/L (ref 98–107)
CO2 SERPL-SCNC: 28 MMOL/L (ref 22–29)
CREAT SERPL-MCNC: 0.73 MG/DL (ref 0.76–1.27)
DEPRECATED RDW RBC AUTO: 47.3 FL (ref 37–54)
EGFRCR SERPLBLD CKD-EPI 2021: 93.1 ML/MIN/1.73
ERYTHROCYTE [DISTWIDTH] IN BLOOD BY AUTOMATED COUNT: 14.4 % (ref 12.3–15.4)
GLUCOSE SERPL-MCNC: 102 MG/DL (ref 65–99)
HCT VFR BLD AUTO: 38.7 % (ref 37.5–51)
HGB BLD-MCNC: 12.6 G/DL (ref 13–17.7)
MCH RBC QN AUTO: 30.3 PG (ref 26.6–33)
MCHC RBC AUTO-ENTMCNC: 32.5 G/DL (ref 31.5–35.7)
MCV RBC AUTO: 93.2 FL (ref 79–97)
PLATELET # BLD AUTO: 371 10*3/MM3 (ref 140–450)
PMV BLD AUTO: 9.8 FL (ref 6–12)
POTASSIUM SERPL-SCNC: 3.8 MMOL/L (ref 3.5–5.2)
RBC # BLD AUTO: 4.15 10*6/MM3 (ref 4.14–5.8)
SODIUM SERPL-SCNC: 140 MMOL/L (ref 136–145)
WBC NRBC COR # BLD AUTO: 20.4 10*3/MM3 (ref 3.4–10.8)

## 2024-01-04 PROCEDURE — 94799 UNLISTED PULMONARY SVC/PX: CPT

## 2024-01-04 PROCEDURE — 87205 SMEAR GRAM STAIN: CPT | Performed by: STUDENT IN AN ORGANIZED HEALTH CARE EDUCATION/TRAINING PROGRAM

## 2024-01-04 PROCEDURE — 94664 DEMO&/EVAL PT USE INHALER: CPT

## 2024-01-04 PROCEDURE — 25810000003 SODIUM CHLORIDE 0.9 % SOLUTION 250 ML FLEX CONT: Performed by: STUDENT IN AN ORGANIZED HEALTH CARE EDUCATION/TRAINING PROGRAM

## 2024-01-04 PROCEDURE — 85027 COMPLETE CBC AUTOMATED: CPT | Performed by: STUDENT IN AN ORGANIZED HEALTH CARE EDUCATION/TRAINING PROGRAM

## 2024-01-04 PROCEDURE — 25010000002 AZITHROMYCIN PER 500 MG: Performed by: STUDENT IN AN ORGANIZED HEALTH CARE EDUCATION/TRAINING PROGRAM

## 2024-01-04 PROCEDURE — 80048 BASIC METABOLIC PNL TOTAL CA: CPT | Performed by: STUDENT IN AN ORGANIZED HEALTH CARE EDUCATION/TRAINING PROGRAM

## 2024-01-04 PROCEDURE — 94761 N-INVAS EAR/PLS OXIMETRY MLT: CPT

## 2024-01-04 PROCEDURE — 25010000002 CEFTRIAXONE PER 250 MG: Performed by: STUDENT IN AN ORGANIZED HEALTH CARE EDUCATION/TRAINING PROGRAM

## 2024-01-04 PROCEDURE — 87070 CULTURE OTHR SPECIMN AEROBIC: CPT | Performed by: STUDENT IN AN ORGANIZED HEALTH CARE EDUCATION/TRAINING PROGRAM

## 2024-01-04 RX ADMIN — IPRATROPIUM BROMIDE AND ALBUTEROL SULFATE 3 ML: .5; 3 SOLUTION RESPIRATORY (INHALATION) at 07:42

## 2024-01-04 RX ADMIN — CEFTRIAXONE 2000 MG: 2 INJECTION, POWDER, FOR SOLUTION INTRAMUSCULAR; INTRAVENOUS at 18:32

## 2024-01-04 RX ADMIN — Medication 10 ML: at 08:40

## 2024-01-04 RX ADMIN — BUDESONIDE INHALATION 0.5 MG: 0.5 SUSPENSION RESPIRATORY (INHALATION) at 07:46

## 2024-01-04 RX ADMIN — BUDESONIDE INHALATION 0.5 MG: 0.5 SUSPENSION RESPIRATORY (INHALATION) at 20:32

## 2024-01-04 RX ADMIN — AZITHROMYCIN MONOHYDRATE 500 MG: 500 INJECTION, POWDER, LYOPHILIZED, FOR SOLUTION INTRAVENOUS at 17:16

## 2024-01-04 RX ADMIN — IPRATROPIUM BROMIDE AND ALBUTEROL SULFATE 3 ML: .5; 3 SOLUTION RESPIRATORY (INHALATION) at 20:26

## 2024-01-04 RX ADMIN — IPRATROPIUM BROMIDE AND ALBUTEROL SULFATE 3 ML: .5; 3 SOLUTION RESPIRATORY (INHALATION) at 15:04

## 2024-01-04 RX ADMIN — ATORVASTATIN CALCIUM 10 MG: 10 TABLET, FILM COATED ORAL at 08:39

## 2024-01-04 RX ADMIN — IPRATROPIUM BROMIDE AND ALBUTEROL SULFATE 3 ML: .5; 3 SOLUTION RESPIRATORY (INHALATION) at 11:19

## 2024-01-04 RX ADMIN — DOCUSATE SODIUM AND SENNOSIDES 2 TABLET: 8.6; 5 TABLET, FILM COATED ORAL at 08:39

## 2024-01-04 RX ADMIN — AMLODIPINE BESYLATE 5 MG: 5 TABLET ORAL at 08:39

## 2024-01-04 RX ADMIN — METOPROLOL SUCCINATE 50 MG: 50 TABLET, EXTENDED RELEASE ORAL at 08:39

## 2024-01-04 NOTE — PLAN OF CARE
Goal Outcome Evaluation: Pt up in chair this evening, Reports feeling some better. Remains dependent on oxygen at this time. Remains on IV abx. Plan of care ongoing

## 2024-01-04 NOTE — PLAN OF CARE
Goal Outcome Evaluation:  Plan of Care Reviewed With: patient        Progress: improving  Outcome Evaluation: Pt continues IVFs, IV antibiotics and oxygen at 2L via N/C. Pt up ad kristopher but gets short of breath if walking too far. VSS, no concerns at this time. Plan to have bronchoscopy tomorrow.

## 2024-01-04 NOTE — PROGRESS NOTES
Daily Progress Note          Assessment    Bilateral pneumonia with tree-in-bud pattern and areas of consolidation  Multiple mucous plugs  Hypoxemia  COPD  Tobacco smoking     Recommendations:  Schedule bronchoscopy tomorrow to remove mucous plugs and obtain cultures   antibiotics,  Patient will need follow-up CT scan of the chest in 4-6 weeks to ensure resolution of the infiltrates  Oxygen supplement and titration to maintain saturation 90 to 95%: Currently requiring 2 L per nasal cannula  Currently on 2 L per nasal cannula  Bronchodilators  Inhaled corticosteroids  Smoking cessation counseling  DVT/GI prophylaxis  Check daily labs and correct electrolytes as needed  I personally reviewed the radiological studies             LOS: 2 days     Subjective     Patient reports cough and shortness of breath    Objective     Vital signs for last 24 hours:  Vitals:    01/04/24 0749 01/04/24 0839 01/04/24 1119 01/04/24 1122   BP:  135/78     BP Location:       Patient Position:       Pulse: 74 71 74 74   Resp: 18  18 17   Temp:       TempSrc:       SpO2: 95%  94% 94%   Weight:       Height:           Intake/Output last 3 shifts:  I/O last 3 completed shifts:  In: 3646.3 [P.O.:1080; I.V.:2566.3]  Out: 1000 [Urine:1000]  Intake/Output this shift:  I/O this shift:  In: 240 [P.O.:240]  Out: 225 [Urine:225]      Radiology  Imaging Results (Last 24 Hours)       ** No results found for the last 24 hours. **            Labs:  Results from last 7 days   Lab Units 01/04/24  0048   WBC 10*3/mm3 20.40*   HEMOGLOBIN g/dL 12.6*   HEMATOCRIT % 38.7   PLATELETS 10*3/mm3 371     Results from last 7 days   Lab Units 01/04/24  0048 01/03/24  0106 01/02/24  1541   SODIUM mmol/L 140   < > 137   POTASSIUM mmol/L 3.8   < > 3.4*   CHLORIDE mmol/L 102   < > 96*   CO2 mmol/L 28.0   < > 33.2*   BUN mg/dL 19   < > 21   CREATININE mg/dL 0.73*   < > 0.97   CALCIUM mg/dL 8.6   < > 9.0   BILIRUBIN mg/dL  --   --  0.7   ALK PHOS U/L  --   --  273*   ALT  (SGPT) U/L  --   --  56*   AST (SGOT) U/L  --   --  37   GLUCOSE mg/dL 102*   < > 118*    < > = values in this interval not displayed.         Results from last 7 days   Lab Units 01/02/24  1541   ALBUMIN g/dL 3.5     Results from last 7 days   Lab Units 01/02/24  1755 01/02/24  1541   HSTROP T ng/L 14 15  15             Results from last 7 days   Lab Units 01/03/24  0106   INR  1.14*               Meds:   SCHEDULE  amLODIPine, 5 mg, Oral, Daily  atorvastatin, 10 mg, Oral, Daily  azithromycin, 500 mg, Intravenous, Q24H  budesonide, 0.5 mg, Nebulization, BID - RT  cefTRIAXone (ROCEPHIN) 2,000 mg in sodium chloride 0.9 % 100 mL IVPB, 2,000 mg, Intravenous, Q24H  ipratropium-albuterol, 3 mL, Nebulization, 4x Daily - RT  metoprolol succinate XL, 50 mg, Oral, Daily  senna-docusate sodium, 2 tablet, Oral, BID  sodium chloride, 10 mL, Intravenous, Q12H      Infusions  lactated ringers, 75 mL/hr, Last Rate: 75 mL/hr (01/02/24 2242)      PRNs    senna-docusate sodium **AND** polyethylene glycol **AND** bisacodyl **AND** bisacodyl    Calcium Replacement - Follow Nurse / BPA Driven Protocol    Magnesium Standard Dose Replacement - Follow Nurse / BPA Driven Protocol    nitroglycerin    ondansetron ODT **OR** ondansetron    Phosphorus Replacement - Follow Nurse / BPA Driven Protocol    Potassium Replacement - Follow Nurse / BPA Driven Protocol    sodium chloride    sodium chloride    Physical Exam:  General Appearance:  Alert   HEENT:  Normocephalic, without obvious abnormality, Conjunctiva/corneas clear,.   Nares normal, no drainage     Neck:  Supple, symmetrical, trachea midline.   Lungs /Chest wall: Distant breath sounds with bilateral basal rhonchi, respirations unlabored, symmetrical wall movement.     Heart:  Regular rate and rhythm, S1 S2 normal  Abdomen: Soft, non-tender, no masses, no organomegaly.    Extremities: No edema, no clubbing or cyanosis     ROS  Constitutional: Negative for chills, fever and malaise/fatigue.    HENT: Negative.    Eyes: Negative.    Cardiovascular: Negative.    Respiratory: Positive for cough and shortness of breath.    Skin: Negative.    Musculoskeletal: Negative.    Gastrointestinal: Negative.    Genitourinary: Negative.    Neurological: Negative.    Psychiatric/Behavioral: Negative.      I reviewed the recent clinical results  I personally reviewed the latest radiological studies    Part of this note may be an electronic transcription/translation of spoken language to printed text using the Dragon Dictation System.

## 2024-01-04 NOTE — CASE MANAGEMENT/SOCIAL WORK
Continued Stay Note  [facility]     Patient Name: Lizz Powell  MRN: [id number]  Today's Date: 1/4/2024    Admit Date: 1/2/2024    Plan: Plan to return home in [place], can stay at daughter's home locally if need.   Discharge Plan       Row Name 01/04/24 1052       Plan    Plan Plan to return home in [place], can stay at daughter's home locally if need.    Plan Comments Plan to return home in [place], can stay at daughter's home locally if need.  DC Barriers:  IV Abxs, 1L O2.                 Expected Discharge Date and Time       Expected Discharge Date Expected Discharge Time    Jan 5, 2024               SANCHO Santos RN  SIPS/ICU   O: [phone]  C: [phone]  [email or web address]

## 2024-01-04 NOTE — PROGRESS NOTES
[facility] MEDICINE SERVICE  DAILY PROGRESS NOTE    NAME: Lizz Powell  : 1945  MRN: [id number]      LOS: 2 days     PROVIDER OF SERVICE: Lisandro Fuchs MD    Chief Complaint: Bilateral pneumonia    Subjective:     Interval History:  History taken from: patient      Patient doing stable reported breathing is getting better.    Review of Systems:   Review of Systems   Respiratory:  Positive for cough and shortness of breath.        Objective:     Vital Signs  Temp:  [97.3 °F (36.3 °C)-98.1 °F (36.7 °C)] 98.1 °F (36.7 °C)  Heart Rate:  [71-85] 71  Resp:  [18-20] 18  BP: (133-156)/(56-78) 135/78  Flow (L/min):  [2] 2   Body mass index is 29.01 kg/m².    Physical Exam  Physical Exam  Vitals and nursing note reviewed.   Constitutional:       General: He is not in acute distress.     Appearance: He is well-developed. He is not diaphoretic.   HENT:      Head: Normocephalic and atraumatic.   Cardiovascular:      Rate and Rhythm: Normal rate.   Pulmonary:      Effort: Pulmonary effort is normal. No respiratory distress.      Breath sounds: No wheezing.   Abdominal:      General: There is no distension.      Palpations: Abdomen is soft.   Musculoskeletal:         General: Normal range of motion.   Skin:     General: Skin is warm and dry.   Neurological:      Mental Status: He is alert.      Cranial Nerves: No cranial nerve deficit.   Psychiatric:         Behavior: Behavior normal.         Thought Content: Thought content normal.         Judgment: Judgment normal.         Scheduled Meds   amLODIPine, 5 mg, Oral, Daily  atorvastatin, 10 mg, Oral, Daily  azithromycin, 500 mg, Intravenous, Q24H  budesonide, 0.5 mg, Nebulization, BID - RT  cefTRIAXone (ROCEPHIN) 2,000 mg in sodium chloride 0.9 % 100 mL IVPB, 2,000 mg, Intravenous, Q24H  ipratropium-albuterol, 3 mL, Nebulization, 4x Daily - RT  metoprolol succinate XL, 50 mg, Oral, Daily  senna-docusate sodium, 2 tablet, Oral, BID  sodium chloride, 10 mL,  Intravenous, Q12H       PRN Meds     senna-docusate sodium **AND** polyethylene glycol **AND** bisacodyl **AND** bisacodyl    Calcium Replacement - Follow Nurse / BPA Driven Protocol    Magnesium Standard Dose Replacement - Follow Nurse / BPA Driven Protocol    nitroglycerin    ondansetron ODT **OR** ondansetron    Phosphorus Replacement - Follow Nurse / BPA Driven Protocol    Potassium Replacement - Follow Nurse / BPA Driven Protocol    sodium chloride    sodium chloride   Infusions  lactated ringers, 75 mL/hr, Last Rate: 75 mL/hr (01/02/24 0352)          Diagnostic Data    Results from last 7 days   Lab Units 01/04/24  0048 01/03/24  0106 01/02/24  1541   WBC 10*3/mm3 20.40*   < > 29.50*   HEMOGLOBIN g/dL 12.6*   < > 15.4   HEMATOCRIT % 38.7   < > 48.4   PLATELETS 10*3/mm3 371   < > 413   GLUCOSE mg/dL 102*   < > 118*   CREATININE mg/dL 0.73*   < > 0.97   BUN mg/dL 19   < > 21   SODIUM mmol/L 140   < > 137   POTASSIUM mmol/L 3.8   < > 3.4*   AST (SGOT) U/L  --   --  37   ALT (SGPT) U/L  --   --  56*   ALK PHOS U/L  --   --  273*   BILIRUBIN mg/dL  --   --  0.7   ANION GAP mmol/L 10.0   < > 7.8    < > = values in this interval not displayed.       CT Angiogram Chest Pulmonary Embolism    Result Date: 1/3/2024  Impression: 1.No evidence of pulmonary embolism. 2.There is diffuse peribronchial thickening with segmentally obstructive endobronchial debris and extensive tree-in-bud nodules scattered throughout both lungs. Findings are concerning for pneumonia and infectious bronchiolitis 3.Mild coronary artery calcification. Electronically Signed: Danilo Alexander MD  1/3/2024 1:56 AM EST  Workstation ID: [id number]    XR Chest 1 View    Result Date: 1/2/2024  Impression: 1. Bilateral perihilar airspace and interstitial opacities, left greater than right, suggesting atypical infection pattern or edema. Small left pleural effusion. 2. There is prominence of the right pulmonary hilum which may be due to adenopathy. This may be  reactive given the pulmonary findings. Attention on follow-up recommended to rule out neoplasm. Electronically Signed: Brock Ramon MD  1/2/2024 3:10 PM EST  Workstation ID: [id number]       I reviewed the patient's new clinical results.    Assessment/Plan:     Active and Resolved Problems  Active Hospital Problems    Diagnosis  POA    **Bilateral pneumonia [J18.9]  Yes      Resolved Hospital Problems   No resolved problems to display.     #Multi-focal Pneumonia  #Respiratory insufficiency    - Patient 87% on Room air at JR, placed on 2L NC    - CXR shows bilateral airspace opacities L>R and small left pleural effusion with adenopathy.    - CT PE     - covid/flu negative    - strep, legionella, sputum    - blood cultures    - Rocephin 2g IV daily    - Azithromycin 500mg IV x 3 days    - pulm consulted, suspect will need bronch to rule out neoplasm    - NPO    - LR @ 75/hr    - Lactate 1.5, monitor    - HS troponin 15 > 14, doubtful ACS    - proBNP 179.3, doubt fluid overload  Pulmonology also managing, we will continue to monitor continue to follow cultures.  Continue with IV antibiotics     #COPD    - Duoneb QID    - Pulmicort BID    - hold off IV steroids at this time  Continue with nebulizer treatments  Continue to wean oxygen as tolerated.     #Hypokalemia    - replace per protocol     #Tobacco use disorder    - cessation recommended      DVT prophylaxis:  Mechanical DVT prophylaxis orders are present.     Code status is   Code Status and Medical Interventions:   Ordered at: 01/02/24 2120     Code Status (Patient has no pulse and is not breathing):    CPR (Attempt to Resuscitate)     Medical Interventions (Patient has pulse or is breathing):    Full Support       Plan for disposition: Home in 1-2 days    Time: 30 minutes    Signature: Electronically signed by Lisandro Fuchs MD, 01/04/24, 09:20 EST.  [facility] Hospitalist Team

## 2024-01-05 ENCOUNTER — ANESTHESIA EVENT (OUTPATIENT)
Dept: GASTROENTEROLOGY | Facility: HOSPITAL | Age: 79
End: 2024-01-05
Payer: MEDICARE

## 2024-01-05 ENCOUNTER — ANESTHESIA (OUTPATIENT)
Dept: GASTROENTEROLOGY | Facility: HOSPITAL | Age: 79
End: 2024-01-05
Payer: MEDICARE

## 2024-01-05 LAB
ANION GAP SERPL CALCULATED.3IONS-SCNC: 10 MMOL/L (ref 5–15)
BUN SERPL-MCNC: 11 MG/DL (ref 8–23)
BUN/CREAT SERPL: 16.2 (ref 7–25)
CALCIUM SPEC-SCNC: 8.7 MG/DL (ref 8.6–10.5)
CHLORIDE SERPL-SCNC: 104 MMOL/L (ref 98–107)
CO2 SERPL-SCNC: 27 MMOL/L (ref 22–29)
CREAT SERPL-MCNC: 0.68 MG/DL (ref 0.76–1.27)
DEPRECATED RDW RBC AUTO: 46.8 FL (ref 37–54)
DEPRECATED RDW RBC AUTO: 48.1 FL (ref 37–54)
EGFRCR SERPLBLD CKD-EPI 2021: 95.1 ML/MIN/1.73
ERYTHROCYTE [DISTWIDTH] IN BLOOD BY AUTOMATED COUNT: 14.2 % (ref 12.3–15.4)
ERYTHROCYTE [DISTWIDTH] IN BLOOD BY AUTOMATED COUNT: 14.3 % (ref 12.3–15.4)
GLUCOSE SERPL-MCNC: 104 MG/DL (ref 65–99)
HCT VFR BLD AUTO: 39.2 % (ref 37.5–51)
HCT VFR BLD AUTO: 40.4 % (ref 37.5–51)
HGB BLD-MCNC: 12.8 G/DL (ref 13–17.7)
HGB BLD-MCNC: 13.2 G/DL (ref 13–17.7)
MCH RBC QN AUTO: 29.8 PG (ref 26.6–33)
MCH RBC QN AUTO: 30.3 PG (ref 26.6–33)
MCHC RBC AUTO-ENTMCNC: 32.7 G/DL (ref 31.5–35.7)
MCHC RBC AUTO-ENTMCNC: 32.8 G/DL (ref 31.5–35.7)
MCV RBC AUTO: 91 FL (ref 79–97)
MCV RBC AUTO: 92.7 FL (ref 79–97)
PLATELET # BLD AUTO: 349 10*3/MM3 (ref 140–450)
PLATELET # BLD AUTO: 381 10*3/MM3 (ref 140–450)
PMV BLD AUTO: 9.7 FL (ref 6–12)
PMV BLD AUTO: 9.9 FL (ref 6–12)
POTASSIUM SERPL-SCNC: 4.2 MMOL/L (ref 3.5–5.2)
RBC # BLD AUTO: 4.31 10*6/MM3 (ref 4.14–5.8)
RBC # BLD AUTO: 4.36 10*6/MM3 (ref 4.14–5.8)
SODIUM SERPL-SCNC: 141 MMOL/L (ref 136–145)
WBC NRBC COR # BLD AUTO: 19 10*3/MM3 (ref 3.4–10.8)
WBC NRBC COR # BLD AUTO: 26.8 10*3/MM3 (ref 3.4–10.8)

## 2024-01-05 PROCEDURE — 94761 N-INVAS EAR/PLS OXIMETRY MLT: CPT

## 2024-01-05 PROCEDURE — 87206 SMEAR FLUORESCENT/ACID STAI: CPT | Performed by: INTERNAL MEDICINE

## 2024-01-05 PROCEDURE — 25010000002 CEFTRIAXONE PER 250 MG: Performed by: STUDENT IN AN ORGANIZED HEALTH CARE EDUCATION/TRAINING PROGRAM

## 2024-01-05 PROCEDURE — 94799 UNLISTED PULMONARY SVC/PX: CPT

## 2024-01-05 PROCEDURE — 87116 MYCOBACTERIA CULTURE: CPT | Performed by: INTERNAL MEDICINE

## 2024-01-05 PROCEDURE — 87071 CULTURE AEROBIC QUANT OTHER: CPT | Performed by: INTERNAL MEDICINE

## 2024-01-05 PROCEDURE — 87798 DETECT AGENT NOS DNA AMP: CPT | Performed by: INTERNAL MEDICINE

## 2024-01-05 PROCEDURE — 87102 FUNGUS ISOLATION CULTURE: CPT | Performed by: INTERNAL MEDICINE

## 2024-01-05 PROCEDURE — 94664 DEMO&/EVAL PT USE INHALER: CPT

## 2024-01-05 PROCEDURE — 3E1F88Z IRRIGATION OF RESPIRATORY TRACT USING IRRIGATING SUBSTANCE, VIA NATURAL OR ARTIFICIAL OPENING ENDOSCOPIC: ICD-10-PCS | Performed by: INTERNAL MEDICINE

## 2024-01-05 PROCEDURE — 87205 SMEAR GRAM STAIN: CPT | Performed by: INTERNAL MEDICINE

## 2024-01-05 PROCEDURE — 25010000002 PROPOFOL 1000 MG/100ML EMULSION: Performed by: NURSE ANESTHETIST, CERTIFIED REGISTERED

## 2024-01-05 PROCEDURE — 85027 COMPLETE CBC AUTOMATED: CPT | Performed by: STUDENT IN AN ORGANIZED HEALTH CARE EDUCATION/TRAINING PROGRAM

## 2024-01-05 PROCEDURE — 25010000002 METHYLPREDNISOLONE PER 40 MG: Performed by: INTERNAL MEDICINE

## 2024-01-05 PROCEDURE — 87252 VIRUS INOCULATION TISSUE: CPT | Performed by: INTERNAL MEDICINE

## 2024-01-05 PROCEDURE — 25810000003 SODIUM CHLORIDE 0.9 % SOLUTION: Performed by: NURSE ANESTHETIST, CERTIFIED REGISTERED

## 2024-01-05 PROCEDURE — 80048 BASIC METABOLIC PNL TOTAL CA: CPT | Performed by: STUDENT IN AN ORGANIZED HEALTH CARE EDUCATION/TRAINING PROGRAM

## 2024-01-05 PROCEDURE — 88108 CYTOPATH CONCENTRATE TECH: CPT | Performed by: INTERNAL MEDICINE

## 2024-01-05 PROCEDURE — 0B9F8ZX DRAINAGE OF RIGHT LOWER LUNG LOBE, VIA NATURAL OR ARTIFICIAL OPENING ENDOSCOPIC, DIAGNOSTIC: ICD-10-PCS | Performed by: INTERNAL MEDICINE

## 2024-01-05 RX ORDER — IPRATROPIUM BROMIDE AND ALBUTEROL SULFATE 2.5; .5 MG/3ML; MG/3ML
3 SOLUTION RESPIRATORY (INHALATION) ONCE
Status: COMPLETED | OUTPATIENT
Start: 2024-01-05 | End: 2024-01-05

## 2024-01-05 RX ORDER — SODIUM CHLORIDE 9 MG/ML
INJECTION, SOLUTION INTRAVENOUS CONTINUOUS PRN
Status: DISCONTINUED | OUTPATIENT
Start: 2024-01-05 | End: 2024-01-05 | Stop reason: SURG

## 2024-01-05 RX ORDER — LIDOCAINE HYDROCHLORIDE 20 MG/ML
INJECTION, SOLUTION INFILTRATION; PERINEURAL AS NEEDED
Status: DISCONTINUED | OUTPATIENT
Start: 2024-01-05 | End: 2024-01-05 | Stop reason: HOSPADM

## 2024-01-05 RX ORDER — METHYLPREDNISOLONE SODIUM SUCCINATE 40 MG/ML
40 INJECTION, POWDER, LYOPHILIZED, FOR SOLUTION INTRAMUSCULAR; INTRAVENOUS EVERY 12 HOURS
Status: DISCONTINUED | OUTPATIENT
Start: 2024-01-05 | End: 2024-01-07

## 2024-01-05 RX ORDER — LIDOCAINE HYDROCHLORIDE 20 MG/ML
JELLY TOPICAL AS NEEDED
Status: DISCONTINUED | OUTPATIENT
Start: 2024-01-05 | End: 2024-01-05 | Stop reason: HOSPADM

## 2024-01-05 RX ORDER — LIDOCAINE HYDROCHLORIDE 20 MG/ML
INJECTION, SOLUTION INFILTRATION; PERINEURAL AS NEEDED
Status: DISCONTINUED | OUTPATIENT
Start: 2024-01-05 | End: 2024-01-05 | Stop reason: SURG

## 2024-01-05 RX ORDER — GUAIFENESIN 600 MG/1
1200 TABLET, EXTENDED RELEASE ORAL EVERY 12 HOURS SCHEDULED
Status: DISCONTINUED | OUTPATIENT
Start: 2024-01-05 | End: 2024-01-08 | Stop reason: HOSPADM

## 2024-01-05 RX ORDER — PROPOFOL 10 MG/ML
INJECTION, EMULSION INTRAVENOUS AS NEEDED
Status: DISCONTINUED | OUTPATIENT
Start: 2024-01-05 | End: 2024-01-05 | Stop reason: SURG

## 2024-01-05 RX ADMIN — METHYLPREDNISOLONE SODIUM SUCCINATE 40 MG: 40 INJECTION, POWDER, FOR SOLUTION INTRAMUSCULAR; INTRAVENOUS at 20:41

## 2024-01-05 RX ADMIN — PROPOFOL INJECTABLE EMULSION 20 MG: 10 INJECTION, EMULSION INTRAVENOUS at 08:14

## 2024-01-05 RX ADMIN — METHYLPREDNISOLONE SODIUM SUCCINATE 40 MG: 40 INJECTION, POWDER, FOR SOLUTION INTRAMUSCULAR; INTRAVENOUS at 11:09

## 2024-01-05 RX ADMIN — LIDOCAINE HYDROCHLORIDE 60 MG: 20 INJECTION, SOLUTION INFILTRATION; PERINEURAL at 08:13

## 2024-01-05 RX ADMIN — IPRATROPIUM BROMIDE AND ALBUTEROL SULFATE 3 ML: .5; 3 SOLUTION RESPIRATORY (INHALATION) at 15:03

## 2024-01-05 RX ADMIN — BUDESONIDE INHALATION 0.5 MG: 0.5 SUSPENSION RESPIRATORY (INHALATION) at 19:46

## 2024-01-05 RX ADMIN — SODIUM CHLORIDE: 9 INJECTION, SOLUTION INTRAVENOUS at 08:11

## 2024-01-05 RX ADMIN — IPRATROPIUM BROMIDE AND ALBUTEROL SULFATE 3 ML: .5; 3 SOLUTION RESPIRATORY (INHALATION) at 19:46

## 2024-01-05 RX ADMIN — GUAIFENESIN 1200 MG: 600 TABLET, MULTILAYER, EXTENDED RELEASE ORAL at 20:41

## 2024-01-05 RX ADMIN — ATORVASTATIN CALCIUM 10 MG: 10 TABLET, FILM COATED ORAL at 11:10

## 2024-01-05 RX ADMIN — METOPROLOL SUCCINATE 50 MG: 50 TABLET, EXTENDED RELEASE ORAL at 11:10

## 2024-01-05 RX ADMIN — GUAIFENESIN 1200 MG: 600 TABLET, MULTILAYER, EXTENDED RELEASE ORAL at 11:09

## 2024-01-05 RX ADMIN — PROPOFOL INJECTABLE EMULSION 50 MG: 10 INJECTION, EMULSION INTRAVENOUS at 08:13

## 2024-01-05 RX ADMIN — CEFTRIAXONE 2000 MG: 2 INJECTION, POWDER, FOR SOLUTION INTRAMUSCULAR; INTRAVENOUS at 17:07

## 2024-01-05 RX ADMIN — Medication 10 ML: at 11:10

## 2024-01-05 RX ADMIN — IPRATROPIUM BROMIDE AND ALBUTEROL SULFATE 3 ML: .5; 3 SOLUTION RESPIRATORY (INHALATION) at 07:49

## 2024-01-05 RX ADMIN — AMLODIPINE BESYLATE 5 MG: 5 TABLET ORAL at 11:10

## 2024-01-05 RX ADMIN — Medication 10 ML: at 20:41

## 2024-01-05 NOTE — CASE MANAGEMENT/SOCIAL WORK
"Social Work Assessment  [facility]     Patient Name: Lizz Powell  MRN: [id number]  Today's Date: 1/5/2024    Admit Date: 1/2/2024     Demographic Summary       Row Name 01/05/24 0928       General Information    Reason for Consult insurance concerns      General Information Comments SW received a SC stating pt is inquiring of insurance concerns related to current stay. KECIA spoke with pt's dtr Jenny who actually was inquiring of how many \"long term care days\" pt has remaining. KECIA consulted with RNCM and informed them they would need to call their insurance re: this, as pt is not rec'd for SNF, rather, he will return home at d/c. Jenny thanked this writer and agreed to reach out to insurance directly.           JOSE Serrano, W  Medical Social Worker  Ph [phone]  Fax [phone]  [email or web address]      "

## 2024-01-05 NOTE — ANESTHESIA POSTPROCEDURE EVALUATION
Patient: Lizz Powell    Procedure Summary       Date: 01/05/24 Room / Location: [facility] ENDOSCOPY 2 / [facility] ENDOSCOPY    Anesthesia Start: 0811 Anesthesia Stop: 0828    Procedure: BRONCHOSCOPY with bronchoalveolar lavage (Bronchus) Diagnosis:       Multiple tracheobronchial mucus plugs      Pneumonia of both lungs due to infectious organism, unspecified part of lung      (Multiple tracheobronchial mucus plugs [T17.800A])      (Pneumonia of both lungs due to infectious organism, unspecified part of lung [J18.9])    Surgeons: Sheldon Montiel MD Provider: David Chapa MD    Anesthesia Type: general, MAC ASA Status: 3            Anesthesia Type: general, MAC    Vitals  Vitals Value Taken Time   /60 01/05/24 0834   Temp     Pulse 90 01/05/24 0841   Resp 16 01/05/24 0834   SpO2 90 % 01/05/24 0841   Vitals shown include unfiled device data.        Post Anesthesia Care and Evaluation    Patient location during evaluation: PACU  Patient participation: complete - patient participated  Level of consciousness: awake  Pain scale: See nurse's notes for pain score.  Pain management: adequate    Airway patency: patent  Anesthetic complications: No anesthetic complications  PONV Status: none  Cardiovascular status: acceptable  Respiratory status: acceptable and spontaneous ventilation  Hydration status: acceptable    Comments: Patient seen and examined postoperatively; vital signs stable; SpO2 greater than or equal to 90%; cardiopulmonary status stable; nausea/vomiting adequately controlled; pain adequately controlled; no apparent anesthesia complications; patient discharged from anesthesia care when discharge criteria were met

## 2024-01-05 NOTE — OP NOTE
Bronchoscopy Procedure Note    Procedure:  Bronchoscopy, Diagnostic  Bronchoscopy, Therapeutic lavage of multiple mucous plugs  Bronchoalveolar lavage, BAL from right lower lobe    Pre-Operative Diagnosis: Multifocal pneumonia, multiple mucous plugs    Post-Operative Diagnosis: Same    Indication: Multifocal pneumonia with multiple mucous plugs and patient unable to clear his secretions    Anesthesia: Monitored Anesthesia Care (MAC)    Procedure Details: Patient was consented for the procedure with all risk and benefit of the procedure explained in detail.  Patient was given the opportunity to ask questions and all concerns were answered.    Timeout was done in the standard manner   the bronchoscope was inserted into the main airway via the oropharynx. An anatomical survey was done of the main airways and the subsegmental bronchus of the 5 lobes.  The findings are consistent of normal-appearing and functioning vocal cords, the mucosa of the bronchial tree was edematous and erythematous, large amount of white mucous plugs were seen in the 5 lobes.  A therapeutic lavage was performed using aliquots of normal saline instilled into the airways then aspirated back until clear.  Diagnostic BAL was performed right lower lobe by instilling 90 mL of sterile normal saline and return of 45 mL.    Estimated Blood Loss: None           Specimens: BAL from right lower lobe                Complications:  None; patient tolerated the procedure well.           Disposition: PACU - hemodynamically stable.    Post op plan:  Resume p.o. after 2 hours  Follow-up results    Patient tolerated the procedure well.

## 2024-01-05 NOTE — ANESTHESIA PREPROCEDURE EVALUATION
Anesthesia Evaluation     Patient summary reviewed and Nursing notes reviewed   NPO Solid Status: > 8 hours  NPO Liquid Status: > 8 hours           Airway   Mallampati: II  TM distance: >3 FB  Neck ROM: full  No difficulty expected  Dental - normal exam     Pulmonary    (+) pneumonia , COPD,  Cardiovascular     (+) hypertension, hyperlipidemia      Neuro/Psych  GI/Hepatic/Renal/Endo      Musculoskeletal     Abdominal    Substance History      OB/GYN          Other                    Anesthesia Plan    ASA 3     general and MAC     intravenous induction     Anesthetic plan, risks, benefits, and alternatives have been provided, discussed and informed consent has been obtained with: patient.    Plan discussed with CRNA.    CODE STATUS:    Code Status (Patient has no pulse and is not breathing): CPR (Attempt to Resuscitate)  Medical Interventions (Patient has pulse or is breathing): Full Support

## 2024-01-05 NOTE — PLAN OF CARE
Problem: Adult Inpatient Plan of Care  Goal: Plan of Care Review  Outcome: Ongoing, Progressing  Flowsheets (Taken 1/5/2024 1711)  Progress: improving  Plan of Care Reviewed With:   patient   daughter  Outcome Evaluation: patient is stable. no complaints of pain. coughs often.  Continued with IV abx. Safetymeasures in place. Call light within reach. Patient able to make needs known. Plan of care ongoing.  Goal: Patient-Specific Goal (Individualized)  Outcome: Ongoing, Progressing  Goal: Absence of Hospital-Acquired Illness or Injury  Outcome: Ongoing, Progressing  Intervention: Identify and Manage Fall Risk  Recent Flowsheet Documentation  Taken 1/5/2024 1425 by Malu Shafer LPN  Safety Promotion/Fall Prevention:   activity supervised   assistive device/personal items within reach   clutter free environment maintained   nonskid shoes/slippers when out of bed   room organization consistent   safety round/check completed  Taken 1/5/2024 1228 by Malu Shaefr LPN  Safety Promotion/Fall Prevention:   activity supervised   assistive device/personal items within reach   clutter free environment maintained   nonskid shoes/slippers when out of bed   room organization consistent   safety round/check completed  Taken 1/5/2024 1022 by Malu Shafer LPN  Safety Promotion/Fall Prevention:   activity supervised   assistive device/personal items within reach   clutter free environment maintained   room organization consistent   safety round/check completed  Taken 1/5/2024 0839 by Malu Shafer LPN  Safety Promotion/Fall Prevention: patient off unit  Intervention: Prevent Skin Injury  Recent Flowsheet Documentation  Taken 1/5/2024 0921 by Malu Shafer LPN  Body Position: position changed independently  Intervention: Prevent and Manage VTE (Venous Thromboembolism) Risk  Recent Flowsheet Documentation  Taken 1/5/2024 1228 by Malu Shafer LPN  Activity Management: activity encouraged  Taken 1/5/2024 0921 by Hollis  STEFANY Toribio  Activity Management: activity encouraged  VTE Prevention/Management:   bilateral   sequential compression devices on  Intervention: Prevent Infection  Recent Flowsheet Documentation  Taken 1/5/2024 1228 by Malu Shafer LPN  Infection Prevention:   hand hygiene promoted   personal protective equipment utilized   single patient room provided  Goal: Optimal Comfort and Wellbeing  Outcome: Ongoing, Progressing  Intervention: Provide Person-Centered Care  Recent Flowsheet Documentation  Taken 1/5/2024 0921 by Malu Shafer LPN  Trust Relationship/Rapport:   care explained   questions answered   questions encouraged   thoughts/feelings acknowledged   reassurance provided  Goal: Readiness for Transition of Care  Outcome: Ongoing, Progressing     Problem: COPD (Chronic Obstructive Pulmonary Disease) Comorbidity  Goal: Maintenance of COPD Symptom Control  Outcome: Ongoing, Progressing  Intervention: Maintain COPD-Symptom Control  Recent Flowsheet Documentation  Taken 1/5/2024 1425 by Malu Shafer LPN  Medication Review/Management: medications reviewed  Taken 1/5/2024 1228 by Malu Shafer LPN  Medication Review/Management: medications reviewed  Taken 1/5/2024 1022 by Malu Shafer LPN  Medication Review/Management: medications reviewed     Problem: Hypertension Comorbidity  Goal: Blood Pressure in Desired Range  Outcome: Ongoing, Progressing  Intervention: Maintain Blood Pressure Management  Recent Flowsheet Documentation  Taken 1/5/2024 1425 by Malu Shafer LPN  Medication Review/Management: medications reviewed  Taken 1/5/2024 1228 by Malu Shafer LPN  Medication Review/Management: medications reviewed  Taken 1/5/2024 1022 by Malu Shafer LPN  Medication Review/Management: medications reviewed     Problem: Fluid Imbalance (Pneumonia)  Goal: Fluid Balance  Outcome: Ongoing, Progressing     Problem: Infection (Pneumonia)  Goal: Resolution of Infection Signs and Symptoms  Outcome: Ongoing,  Progressing     Problem: Respiratory Compromise (Pneumonia)  Goal: Effective Oxygenation and Ventilation  Outcome: Ongoing, Progressing  Intervention: Promote Airway Secretion Clearance  Recent Flowsheet Documentation  Taken 1/5/2024 0921 by Malu Shafer LPN  Cough And Deep Breathing: done independently per patient     Problem: Fall Injury Risk  Goal: Absence of Fall and Fall-Related Injury  Outcome: Ongoing, Progressing  Intervention: Identify and Manage Contributors  Recent Flowsheet Documentation  Taken 1/5/2024 1425 by Malu Shafer LPN  Medication Review/Management: medications reviewed  Taken 1/5/2024 1228 by Malu Shafer LPN  Medication Review/Management: medications reviewed  Taken 1/5/2024 1022 by Malu Shafer LPN  Medication Review/Management: medications reviewed  Intervention: Promote Injury-Free Environment  Recent Flowsheet Documentation  Taken 1/5/2024 1425 by Malu Shafer LPN  Safety Promotion/Fall Prevention:   activity supervised   assistive device/personal items within reach   clutter free environment maintained   nonskid shoes/slippers when out of bed   room organization consistent   safety round/check completed  Taken 1/5/2024 1228 by Malu Shafer LPN  Safety Promotion/Fall Prevention:   activity supervised   assistive device/personal items within reach   clutter free environment maintained   nonskid shoes/slippers when out of bed   room organization consistent   safety round/check completed  Taken 1/5/2024 1022 by Malu Shafer LPN  Safety Promotion/Fall Prevention:   activity supervised   assistive device/personal items within reach   clutter free environment maintained   room organization consistent   safety round/check completed  Taken 1/5/2024 0839 by Malu Shafer LPN  Safety Promotion/Fall Prevention: patient off unit   Goal Outcome Evaluation:  Plan of Care Reviewed With: patient, daughter        Progress: improving  Outcome Evaluation: patient is stable. no  complaints of pain. coughs often.  Continued with IV abx. Safetymeasures in place. Call light within reach. Patient able to make needs known. Plan of care ongoing.

## 2024-01-05 NOTE — PROGRESS NOTES
[facility] MEDICINE SERVICE  DAILY PROGRESS NOTE    NAME: Lizz Powell  : 1945  MRN: [id number]      LOS: 3 days     PROVIDER OF SERVICE: Lisandro Fuchs MD    Chief Complaint: Bilateral pneumonia    Subjective:     Interval History:  History taken from: patient      Patient doing better bronchoscopy today as well.    Review of Systems:   Review of Systems   Respiratory:  Positive for shortness of breath.        Objective:     Vital Signs  Temp:  [98 °F (36.7 °C)-99.2 °F (37.3 °C)] 99.2 °F (37.3 °C)  Heart Rate:  [78-96] 95  Resp:  [14-22] 19  BP: (135-174)/(60-76) 148/66  Flow (L/min):  [1-4] 4   Body mass index is 29.01 kg/m².    Physical Exam  Physical Exam  Vitals and nursing note reviewed.   Constitutional:       Appearance: Normal appearance. He is well-developed.   HENT:      Head: Normocephalic.   Cardiovascular:      Rate and Rhythm: Normal rate and regular rhythm.      Heart sounds: Normal heart sounds. No murmur heard.     No friction rub. No gallop.   Pulmonary:      Breath sounds: No stridor.   Abdominal:      General: There is no distension.      Palpations: Abdomen is soft. There is no mass.      Tenderness: There is no abdominal tenderness. There is no guarding.      Hernia: No hernia is present.   Musculoskeletal:         General: No deformity.   Skin:     General: Skin is warm and dry.      Findings: No erythema.   Psychiatric:         Behavior: Behavior normal.         Scheduled Meds   amLODIPine, 5 mg, Oral, Daily  atorvastatin, 10 mg, Oral, Daily  budesonide, 0.5 mg, Nebulization, BID - RT  cefTRIAXone (ROCEPHIN) 2,000 mg in sodium chloride 0.9 % 100 mL IVPB, 2,000 mg, Intravenous, Q24H  guaiFENesin, 1,200 mg, Oral, Q12H  ipratropium-albuterol, 3 mL, Nebulization, 4x Daily - RT  methylPREDNISolone sodium succinate, 40 mg, Intravenous, Q12H  metoprolol succinate XL, 50 mg, Oral, Daily  senna-docusate sodium, 2 tablet, Oral, BID  sodium chloride, 10 mL, Intravenous, Q12H        PRN Meds     senna-docusate sodium **AND** polyethylene glycol **AND** bisacodyl **AND** bisacodyl    Calcium Replacement - Follow Nurse / BPA Driven Protocol    Magnesium Standard Dose Replacement - Follow Nurse / BPA Driven Protocol    nitroglycerin    ondansetron ODT **OR** ondansetron    Phosphorus Replacement - Follow Nurse / BPA Driven Protocol    Potassium Replacement - Follow Nurse / BPA Driven Protocol    sodium chloride    sodium chloride   Infusions  lactated ringers, 75 mL/hr, Last Rate: 75 mL/hr (01/02/24 2242)          Diagnostic Data    Results from last 7 days   Lab Units 01/05/24  0511 01/05/24  0451 01/03/24  0106 01/02/24  1541   WBC 10*3/mm3 19.00*  --    < > 29.50*   HEMOGLOBIN g/dL 13.2  --    < > 15.4   HEMATOCRIT % 40.4  --    < > 48.4   PLATELETS 10*3/mm3 381  --    < > 413   GLUCOSE mg/dL  --  104*   < > 118*   CREATININE mg/dL  --  0.68*   < > 0.97   BUN mg/dL  --  11   < > 21   SODIUM mmol/L  --  141   < > 137   POTASSIUM mmol/L  --  4.2   < > 3.4*   AST (SGOT) U/L  --   --   --  37   ALT (SGPT) U/L  --   --   --  56*   ALK PHOS U/L  --   --   --  273*   BILIRUBIN mg/dL  --   --   --  0.7   ANION GAP mmol/L  --  10.0   < > 7.8    < > = values in this interval not displayed.       No radiology results for the last day      I reviewed the patient's new clinical results.    Assessment/Plan:     Active and Resolved Problems  Active Hospital Problems    Diagnosis  POA    **Bilateral pneumonia [J18.9]  Yes    Multiple tracheobronchial mucus plugs [T17.800A]  Unknown      Resolved Hospital Problems   No resolved problems to display.     #Multi-focal Pneumonia  #Respiratory insufficiency    - Patient 87% on Room air at JR, placed on 2L NC    - CXR shows bilateral airspace opacities L>R and small left pleural effusion with adenopathy.    - CT PE     - covid/flu negative    - strep, legionella, sputum    - blood cultures    - Rocephin 2g IV daily    - Azithromycin 500mg IV x 3 days    - pulm  consulted, suspect will need bronch to rule out neoplasm    - NPO    - LR @ 75/hr    - Lactate 1.5, monitor    - HS troponin 15 > 14, doubtful ACS    - proBNP 179.3, doubt fluid overload  Pulmonology also managing, we will continue to monitor continue to follow cultures.  Continue with IV antibiotics  Bronchoscopy done today, we will continue to monitor.     #COPD    - Duoneb QID    - Pulmicort BID    - hold off IV steroids at this time  Continue with nebulizer treatments  Continue to wean oxygen as tolerated.     #Hypokalemia    - replace per protocol     #Tobacco use disorder    - cessation recommended      DVT prophylaxis:  Mechanical DVT prophylaxis orders are present.     Code status is   Code Status and Medical Interventions:   Ordered at: 01/02/24 2120     Code Status (Patient has no pulse and is not breathing):    CPR (Attempt to Resuscitate)     Medical Interventions (Patient has pulse or is breathing):    Full Support       Plan for disposition: Home in 1-2 days    Time: 30 minutes    Signature: Electronically signed by Lisandro Fuchs MD, 01/05/24, 14:42 EST.  [facility] Hospitalist Team

## 2024-01-05 NOTE — PROGRESS NOTES
Daily Progress Note          Assessment    Bilateral pneumonia with tree-in-bud pattern and areas of consolidation  Multiple mucous plugs  Hypoxemia  COPD  Tobacco smoking  Leukocytosis     Recommendations:  Schedule bronchoscopy today to remove mucous plugs and obtain cultures   antibiotics, leukocytosis is slightly improving  Patient will need follow-up CT scan of the chest in 4-6 weeks to ensure resolution of the infiltrates  Oxygen supplement and titration to maintain saturation 90 to 95%: Currently requiring 2 L per nasal cannula  Currently on 2 L per nasal cannula  Bronchodilators  Inhaled corticosteroids  Smoking cessation counseling  DVT/GI prophylaxis  Check daily labs and correct electrolytes as needed  I personally reviewed the radiological studies             LOS: 3 days     Subjective     Patient reports cough and shortness of breath    Objective     Vital signs for last 24 hours:  Vitals:    01/05/24 0500 01/05/24 0740 01/05/24 0749 01/05/24 0753   BP: 139/68 174/76 157/65 152/67   BP Location: Right arm Right arm     Patient Position: Lying Lying     Pulse: 96 85 82 84   Resp: 18 22 20 20   Temp: 98.2 °F (36.8 °C)      TempSrc: Oral      SpO2: 92% 94% 96% 96%   Weight:       Height:           Intake/Output last 3 shifts:  I/O last 3 completed shifts:  In: 3565.2 [P.O.:1380; I.V.:1985.2; IV Piggyback:200]  Out: 1775 [Urine:1775]  Intake/Output this shift:  No intake/output data recorded.      Radiology  Imaging Results (Last 24 Hours)       ** No results found for the last 24 hours. **            Labs:  Results from last 7 days   Lab Units 01/05/24  0511   WBC 10*3/mm3 19.00*   HEMOGLOBIN g/dL 13.2   HEMATOCRIT % 40.4   PLATELETS 10*3/mm3 381     Results from last 7 days   Lab Units 01/05/24  0451 01/03/24  0106 01/02/24  1541   SODIUM mmol/L 141   < > 137   POTASSIUM mmol/L 4.2   < > 3.4*   CHLORIDE mmol/L 104   < > 96*   CO2 mmol/L 27.0   < > 33.2*   BUN mg/dL 11   < > 21   CREATININE mg/dL 0.68*   <  > 0.97   CALCIUM mg/dL 8.7   < > 9.0   BILIRUBIN mg/dL  --   --  0.7   ALK PHOS U/L  --   --  273*   ALT (SGPT) U/L  --   --  56*   AST (SGOT) U/L  --   --  37   GLUCOSE mg/dL 104*   < > 118*    < > = values in this interval not displayed.         Results from last 7 days   Lab Units 01/02/24  1541   ALBUMIN g/dL 3.5     Results from last 7 days   Lab Units 01/02/24  1755 01/02/24  1541   HSTROP T ng/L 14 15  15             Results from last 7 days   Lab Units 01/03/24  0106   INR  1.14*               Meds:   SCHEDULE  amLODIPine, 5 mg, Oral, Daily  atorvastatin, 10 mg, Oral, Daily  budesonide, 0.5 mg, Nebulization, BID - RT  cefTRIAXone (ROCEPHIN) 2,000 mg in sodium chloride 0.9 % 100 mL IVPB, 2,000 mg, Intravenous, Q24H  ipratropium-albuterol, 3 mL, Nebulization, 4x Daily - RT  metoprolol succinate XL, 50 mg, Oral, Daily  senna-docusate sodium, 2 tablet, Oral, BID  sodium chloride, 10 mL, Intravenous, Q12H      Infusions  lactated ringers, 75 mL/hr, Last Rate: 75 mL/hr (01/02/24 2242)      PRNs    senna-docusate sodium **AND** polyethylene glycol **AND** bisacodyl **AND** bisacodyl    Calcium Replacement - Follow Nurse / BPA Driven Protocol    lidocaine    Lidocaine HCl gel    Magnesium Standard Dose Replacement - Follow Nurse / BPA Driven Protocol    nitroglycerin    ondansetron ODT **OR** ondansetron    Phosphorus Replacement - Follow Nurse / BPA Driven Protocol    Potassium Replacement - Follow Nurse / BPA Driven Protocol    sodium chloride    sodium chloride    Physical Exam:  General Appearance:  Alert   HEENT:  Normocephalic, without obvious abnormality, Conjunctiva/corneas clear,.   Nares normal, no drainage     Neck:  Supple, symmetrical, trachea midline.   Lungs /Chest wall: Distant breath sounds with bilateral basal rhonchi, respirations unlabored, symmetrical wall movement.     Heart:  Regular rate and rhythm, S1 S2 normal  Abdomen: Soft, non-tender, no masses, no organomegaly.    Extremities: No  edema, no clubbing or cyanosis     ROS  Constitutional: Negative for chills, fever and malaise/fatigue.   HENT: Negative.    Eyes: Negative.    Cardiovascular: Negative.    Respiratory: Positive for cough and shortness of breath.    Skin: Negative.    Musculoskeletal: Negative.    Gastrointestinal: Negative.    Genitourinary: Negative.    Neurological: Negative.    Psychiatric/Behavioral: Negative.      I reviewed the recent clinical results  I personally reviewed the latest radiological studies    Part of this note may be an electronic transcription/translation of spoken language to printed text using the Dragon Dictation System.

## 2024-01-05 NOTE — PLAN OF CARE
Goal Outcome Evaluation:      Pt NPO. Consent signed. IVF infusing. Call light in reach.

## 2024-01-05 NOTE — CASE MANAGEMENT/SOCIAL WORK
Continued Stay Note   [facility]     Patient Name: Lizz Powell  MRN: [id number]  Today's Date: 1/5/2024    Admit Date: 1/2/2024    Plan: Plan to return home in [place], can stay at daughter's home locally if need. Watch for O2 needs.   Discharge Plan       Row Name 01/05/24 1445       Plan    Plan Plan to return home in [place], can stay at daughter's home locally if need. Watch for O2 needs.    Plan Comments Plan to return home in [place], can stay at daughter's home locally if need.  Watch for O2 needs. DC Barriers:  IV Abxs, 3L O2, WBC 19.                 Expected Discharge Date and Time       Expected Discharge Date Expected Discharge Time    Jan 6, 2024               SANCHO Santos RN  SIPS/ICU   O: [phone]  C: [phone]  [email or web address]

## 2024-01-06 ENCOUNTER — APPOINTMENT (OUTPATIENT)
Dept: GENERAL RADIOLOGY | Facility: HOSPITAL | Age: 79
End: 2024-01-06
Payer: MEDICARE

## 2024-01-06 LAB
BACTERIA SPEC RESP CULT: NORMAL
GRAM STN SPEC: NORMAL

## 2024-01-06 PROCEDURE — 94799 UNLISTED PULMONARY SVC/PX: CPT

## 2024-01-06 PROCEDURE — 25010000002 METHYLPREDNISOLONE PER 40 MG: Performed by: INTERNAL MEDICINE

## 2024-01-06 PROCEDURE — 71045 X-RAY EXAM CHEST 1 VIEW: CPT

## 2024-01-06 PROCEDURE — 94761 N-INVAS EAR/PLS OXIMETRY MLT: CPT

## 2024-01-06 PROCEDURE — 25010000002 CEFTRIAXONE PER 250 MG: Performed by: STUDENT IN AN ORGANIZED HEALTH CARE EDUCATION/TRAINING PROGRAM

## 2024-01-06 PROCEDURE — 94664 DEMO&/EVAL PT USE INHALER: CPT

## 2024-01-06 RX ORDER — HYDRALAZINE HYDROCHLORIDE 20 MG/ML
10 INJECTION INTRAMUSCULAR; INTRAVENOUS EVERY 6 HOURS PRN
Status: DISCONTINUED | OUTPATIENT
Start: 2024-01-06 | End: 2024-01-08 | Stop reason: HOSPADM

## 2024-01-06 RX ORDER — IPRATROPIUM BROMIDE AND ALBUTEROL SULFATE 2.5; .5 MG/3ML; MG/3ML
3 SOLUTION RESPIRATORY (INHALATION) EVERY 6 HOURS PRN
Status: DISCONTINUED | OUTPATIENT
Start: 2024-01-06 | End: 2024-01-08 | Stop reason: HOSPADM

## 2024-01-06 RX ADMIN — ATORVASTATIN CALCIUM 10 MG: 10 TABLET, FILM COATED ORAL at 08:55

## 2024-01-06 RX ADMIN — GUAIFENESIN 1200 MG: 600 TABLET, MULTILAYER, EXTENDED RELEASE ORAL at 08:55

## 2024-01-06 RX ADMIN — CEFTRIAXONE 2000 MG: 2 INJECTION, POWDER, FOR SOLUTION INTRAMUSCULAR; INTRAVENOUS at 17:41

## 2024-01-06 RX ADMIN — DOCUSATE SODIUM AND SENNOSIDES 2 TABLET: 8.6; 5 TABLET, FILM COATED ORAL at 21:22

## 2024-01-06 RX ADMIN — METHYLPREDNISOLONE SODIUM SUCCINATE 40 MG: 40 INJECTION, POWDER, FOR SOLUTION INTRAMUSCULAR; INTRAVENOUS at 21:22

## 2024-01-06 RX ADMIN — IPRATROPIUM BROMIDE AND ALBUTEROL SULFATE 3 ML: .5; 3 SOLUTION RESPIRATORY (INHALATION) at 20:38

## 2024-01-06 RX ADMIN — BUDESONIDE INHALATION 0.5 MG: 0.5 SUSPENSION RESPIRATORY (INHALATION) at 08:12

## 2024-01-06 RX ADMIN — Medication 10 ML: at 08:55

## 2024-01-06 RX ADMIN — Medication 10 ML: at 21:22

## 2024-01-06 RX ADMIN — BUDESONIDE INHALATION 0.5 MG: 0.5 SUSPENSION RESPIRATORY (INHALATION) at 20:35

## 2024-01-06 RX ADMIN — METHYLPREDNISOLONE SODIUM SUCCINATE 40 MG: 40 INJECTION, POWDER, FOR SOLUTION INTRAMUSCULAR; INTRAVENOUS at 11:13

## 2024-01-06 RX ADMIN — METOPROLOL SUCCINATE 50 MG: 50 TABLET, EXTENDED RELEASE ORAL at 08:55

## 2024-01-06 RX ADMIN — IPRATROPIUM BROMIDE AND ALBUTEROL SULFATE 3 ML: .5; 3 SOLUTION RESPIRATORY (INHALATION) at 08:17

## 2024-01-06 RX ADMIN — GUAIFENESIN 1200 MG: 600 TABLET, MULTILAYER, EXTENDED RELEASE ORAL at 21:22

## 2024-01-06 RX ADMIN — AMLODIPINE BESYLATE 5 MG: 5 TABLET ORAL at 08:55

## 2024-01-06 NOTE — PROGRESS NOTES
[facility] MEDICINE SERVICE  DAILY PROGRESS NOTE    NAME: Lizz Powell  : 1945  MRN: [id number]      LOS: 4 days     PROVIDER OF SERVICE: Lisandro Fuchs MD    Chief Complaint: Bilateral pneumonia    Subjective:     Interval History:  History taken from: patient    Patient is having difficulty breathing and coughing.  Continue with supplemental oxygen    Review of Systems:   Review of Systems   Respiratory:  Positive for cough and shortness of breath.        Objective:     Vital Signs  Temp:  [97.2 °F (36.2 °C)-99.2 °F (37.3 °C)] 97.4 °F (36.3 °C)  Heart Rate:  [79-99] 99  Resp:  [18-38] 19  BP: (134-169)/(61-76) 134/67  Flow (L/min):  [4] 4   Body mass index is 29.01 kg/m².    Physical Exam  Physical Exam  Vitals and nursing note reviewed.   Constitutional:       General: He is not in acute distress.     Appearance: He is well-developed. He is not diaphoretic.   HENT:      Head: Normocephalic and atraumatic.   Cardiovascular:      Rate and Rhythm: Normal rate.   Pulmonary:      Effort: Pulmonary effort is normal. No respiratory distress.      Breath sounds: No wheezing.   Abdominal:      General: There is no distension.      Palpations: Abdomen is soft.   Musculoskeletal:         General: Normal range of motion.   Skin:     General: Skin is warm and dry.   Neurological:      Mental Status: He is alert.      Cranial Nerves: No cranial nerve deficit.   Psychiatric:         Behavior: Behavior normal.         Thought Content: Thought content normal.         Judgment: Judgment normal.         Scheduled Meds   amLODIPine, 5 mg, Oral, Daily  atorvastatin, 10 mg, Oral, Daily  budesonide, 0.5 mg, Nebulization, BID - RT  cefTRIAXone (ROCEPHIN) 2,000 mg in sodium chloride 0.9 % 100 mL IVPB, 2,000 mg, Intravenous, Q24H  guaiFENesin, 1,200 mg, Oral, Q12H  ipratropium-albuterol, 3 mL, Nebulization, 4x Daily - RT  methylPREDNISolone sodium succinate, 40 mg, Intravenous, Q12H  metoprolol succinate XL, 50 mg,  Oral, Daily  senna-docusate sodium, 2 tablet, Oral, BID  sodium chloride, 10 mL, Intravenous, Q12H       PRN Meds     senna-docusate sodium **AND** polyethylene glycol **AND** bisacodyl **AND** bisacodyl    Calcium Replacement - Follow Nurse / BPA Driven Protocol    Magnesium Standard Dose Replacement - Follow Nurse / BPA Driven Protocol    nitroglycerin    ondansetron ODT **OR** ondansetron    Phosphorus Replacement - Follow Nurse / BPA Driven Protocol    Potassium Replacement - Follow Nurse / BPA Driven Protocol    sodium chloride    sodium chloride   Infusions  lactated ringers, 75 mL/hr, Last Rate: 75 mL/hr (01/02/24 2242)          Diagnostic Data    Results from last 7 days   Lab Units 01/05/24  2329 01/05/24  0511 01/05/24  0451 01/03/24  0106 01/02/24  1541   WBC 10*3/mm3 26.80*   < >  --    < > 29.50*   HEMOGLOBIN g/dL 12.8*   < >  --    < > 15.4   HEMATOCRIT % 39.2   < >  --    < > 48.4   PLATELETS 10*3/mm3 349   < >  --    < > 413   GLUCOSE mg/dL  --   --  104*   < > 118*   CREATININE mg/dL  --   --  0.68*   < > 0.97   BUN mg/dL  --   --  11   < > 21   SODIUM mmol/L  --   --  141   < > 137   POTASSIUM mmol/L  --   --  4.2   < > 3.4*   AST (SGOT) U/L  --   --   --   --  37   ALT (SGPT) U/L  --   --   --   --  56*   ALK PHOS U/L  --   --   --   --  273*   BILIRUBIN mg/dL  --   --   --   --  0.7   ANION GAP mmol/L  --   --  10.0   < > 7.8    < > = values in this interval not displayed.       No radiology results for the last day      I reviewed the patient's new clinical results.    Assessment/Plan:     Active and Resolved Problems  Active Hospital Problems    Diagnosis  POA    **Bilateral pneumonia [J18.9]  Yes    Multiple tracheobronchial mucus plugs [T17.800A]  Unknown      Resolved Hospital Problems   No resolved problems to display.     #Multi-focal Pneumonia  #Respiratory insufficiency    - Patient 87% on Room air at , placed on 2L NC    - CXR shows bilateral airspace opacities L>R and small left  pleural effusion with adenopathy.    - CT PE     - covid/flu negative    - strep, legionella, sputum    - blood cultures    - Rocephin 2g IV daily    - Azithromycin 500mg IV x 3 days    - pulm consulted, suspect will need bronch to rule out neoplasm    - NPO    - LR @ 75/hr    - Lactate 1.5, monitor    - HS troponin 15 > 14, doubtful ACS    - proBNP 179.3, doubt fluid overload  Pulmonology also managing, we will continue to monitor continue to follow cultures.  Continue with IV antibiotics  Bronchoscopy done today, we will continue to monitor.  Continue with IV antibiotic and continue to follow cultures  Chest x-ray will be repeated today     #COPD    - Duoneb QID    - Pulmicort BID    - hold off IV steroids at this time  Continue with nebulizer treatments  Continue to wean oxygen as tolerated.     #Hypokalemia    - replace per protocol     #Tobacco use disorder    - cessation recommended      DVT prophylaxis:  Mechanical DVT prophylaxis orders are present.     Code status is   Code Status and Medical Interventions:   Ordered at: 01/02/24 2120     Code Status (Patient has no pulse and is not breathing):    CPR (Attempt to Resuscitate)     Medical Interventions (Patient has pulse or is breathing):    Full Support       Plan for disposition: Home in 1-2 days    Time: 30 minutes    Signature: Electronically signed by Lisandro Fuchs MD, 01/06/24, 09:21 EST.  [facility] Hospitalist Team

## 2024-01-06 NOTE — PROGRESS NOTES
Daily Progress Note          Assessment    Bilateral pneumonia with tree-in-bud pattern and areas of consolidation  Multiple mucous plugs  Hypoxemia  COPD  Tobacco smoking  Leukocytosis     Recommendations:  Status post bronchoscopy 1/5/2024 with removal of multiple white-colored mucous plugs: Monitor results  antibiotics: Rocephin  IV steroids    Patient will need follow-up CT scan of the chest in 4-6 weeks to ensure resolution of the infiltrates  Oxygen supplement and titration to maintain saturation 90 to 95%: Currently requiring 4 L per nasal cannula    Bronchodilators  Inhaled corticosteroids  Mucinex  Smoking cessation counseling  DVT prophylaxis      I personally reviewed the radiological studies             LOS: 4 days     Subjective     Patient reports cough and shortness of breath    Objective     Vital signs for last 24 hours:  Vitals:    01/06/24 0815 01/06/24 0817 01/06/24 0820 01/06/24 0842   BP:    134/67   BP Location:    Right arm   Patient Position:    Sitting   Pulse: 90 84 84 99   Resp: 20 20 20 19   Temp:    97.4 °F (36.3 °C)   TempSrc:    Oral   SpO2: 99% 99% 100% 96%   Weight:       Height:           Intake/Output last 3 shifts:  I/O last 3 completed shifts:  In: 1240 [P.O.:840; I.V.:200; IV Piggyback:200]  Out: 2200 [Urine:2200]  Intake/Output this shift:  I/O this shift:  In: -   Out: 175 [Urine:175]      Radiology  Imaging Results (Last 24 Hours)       ** No results found for the last 24 hours. **            Labs:  Results from last 7 days   Lab Units 01/05/24  2329   WBC 10*3/mm3 26.80*   HEMOGLOBIN g/dL 12.8*   HEMATOCRIT % 39.2   PLATELETS 10*3/mm3 349     Results from last 7 days   Lab Units 01/05/24  0451 01/03/24  0106 01/02/24  1541   SODIUM mmol/L 141   < > 137   POTASSIUM mmol/L 4.2   < > 3.4*   CHLORIDE mmol/L 104   < > 96*   CO2 mmol/L 27.0   < > 33.2*   BUN mg/dL 11   < > 21   CREATININE mg/dL 0.68*   < > 0.97   CALCIUM mg/dL 8.7   < > 9.0   BILIRUBIN mg/dL  --   --  0.7   ALK  PHOS U/L  --   --  273*   ALT (SGPT) U/L  --   --  56*   AST (SGOT) U/L  --   --  37   GLUCOSE mg/dL 104*   < > 118*    < > = values in this interval not displayed.         Results from last 7 days   Lab Units 01/02/24  1541   ALBUMIN g/dL 3.5     Results from last 7 days   Lab Units 01/02/24  1755 01/02/24  1541   HSTROP T ng/L 14 15  15             Results from last 7 days   Lab Units 01/03/24  0106   INR  1.14*               Meds:   SCHEDULE  amLODIPine, 5 mg, Oral, Daily  atorvastatin, 10 mg, Oral, Daily  budesonide, 0.5 mg, Nebulization, BID - RT  cefTRIAXone (ROCEPHIN) 2,000 mg in sodium chloride 0.9 % 100 mL IVPB, 2,000 mg, Intravenous, Q24H  guaiFENesin, 1,200 mg, Oral, Q12H  ipratropium-albuterol, 3 mL, Nebulization, 4x Daily - RT  methylPREDNISolone sodium succinate, 40 mg, Intravenous, Q12H  metoprolol succinate XL, 50 mg, Oral, Daily  senna-docusate sodium, 2 tablet, Oral, BID  sodium chloride, 10 mL, Intravenous, Q12H      Infusions  lactated ringers, 75 mL/hr, Last Rate: 75 mL/hr (01/02/24 2242)      PRNs    senna-docusate sodium **AND** polyethylene glycol **AND** bisacodyl **AND** bisacodyl    Calcium Replacement - Follow Nurse / BPA Driven Protocol    Magnesium Standard Dose Replacement - Follow Nurse / BPA Driven Protocol    nitroglycerin    ondansetron ODT **OR** ondansetron    Phosphorus Replacement - Follow Nurse / BPA Driven Protocol    Potassium Replacement - Follow Nurse / BPA Driven Protocol    sodium chloride    sodium chloride    Physical Exam:  General Appearance:  Alert   HEENT:  Normocephalic, without obvious abnormality, Conjunctiva/corneas clear,.   Nares normal, no drainage     Neck:  Supple, symmetrical, trachea midline.   Lungs /Chest wall: Distant breath sounds with bilateral basal rhonchi, respirations unlabored, symmetrical wall movement.     Heart:  Regular rate and rhythm, S1 S2 normal  Abdomen: Soft, non-tender, no masses, no organomegaly.    Extremities: No edema, no  clubbing or cyanosis     ROS  Constitutional: Negative for chills, fever and malaise/fatigue.   HENT: Negative.    Eyes: Negative.    Cardiovascular: Negative.    Respiratory: Positive for cough and shortness of breath.    Skin: Negative.    Musculoskeletal: Negative.    Gastrointestinal: Negative.    Genitourinary: Negative.    Neurological: Negative.    Psychiatric/Behavioral: Negative.      I reviewed the recent clinical results  I personally reviewed the latest radiological studies    Part of this note may be an electronic transcription/translation of spoken language to printed text using the Dragon Dictation System.

## 2024-01-06 NOTE — PLAN OF CARE
Goal Outcome Evaluation:      Patient alert oriented but forgetful at times. Pain controlled with PO pain medication. Patient screened positive for Sepsis this shift MD notified no new orders. Patient cardiac monitored/ Sandra. Healthy heart diet. Assist x 1 to bathroom. BM this shift. Patient on oxygen 4L/NC. Lungs diminished. Infrequent cough. Patient report able to breath better this shift. Voids with no difficulty. Bed in low position, call in reach, room organized, room near nursing station.

## 2024-01-07 LAB
BACTERIA SPEC AEROBE CULT: NORMAL
DEPRECATED RDW RBC AUTO: 48.6 FL (ref 37–54)
ERYTHROCYTE [DISTWIDTH] IN BLOOD BY AUTOMATED COUNT: 14.7 % (ref 12.3–15.4)
GRAM STN SPEC: NORMAL
GRAM STN SPEC: NORMAL
HCT VFR BLD AUTO: 44.7 % (ref 37.5–51)
HGB BLD-MCNC: 14.4 G/DL (ref 13–17.7)
MCH RBC QN AUTO: 29.9 PG (ref 26.6–33)
MCHC RBC AUTO-ENTMCNC: 32.1 G/DL (ref 31.5–35.7)
MCV RBC AUTO: 93.2 FL (ref 79–97)
PLATELET # BLD AUTO: 424 10*3/MM3 (ref 140–450)
PMV BLD AUTO: 10 FL (ref 6–12)
RBC # BLD AUTO: 4.8 10*6/MM3 (ref 4.14–5.8)
WBC NRBC COR # BLD AUTO: 40.3 10*3/MM3 (ref 3.4–10.8)

## 2024-01-07 PROCEDURE — 25010000002 METHYLPREDNISOLONE PER 40 MG: Performed by: INTERNAL MEDICINE

## 2024-01-07 PROCEDURE — 25010000002 ENOXAPARIN PER 10 MG: Performed by: INTERNAL MEDICINE

## 2024-01-07 PROCEDURE — 25010000002 CEFTRIAXONE PER 250 MG: Performed by: STUDENT IN AN ORGANIZED HEALTH CARE EDUCATION/TRAINING PROGRAM

## 2024-01-07 PROCEDURE — 85027 COMPLETE CBC AUTOMATED: CPT | Performed by: STUDENT IN AN ORGANIZED HEALTH CARE EDUCATION/TRAINING PROGRAM

## 2024-01-07 PROCEDURE — 94799 UNLISTED PULMONARY SVC/PX: CPT

## 2024-01-07 PROCEDURE — 25010000002 HYDRALAZINE PER 20 MG: Performed by: NURSE PRACTITIONER

## 2024-01-07 PROCEDURE — 94664 DEMO&/EVAL PT USE INHALER: CPT

## 2024-01-07 PROCEDURE — 94761 N-INVAS EAR/PLS OXIMETRY MLT: CPT

## 2024-01-07 PROCEDURE — 25810000003 LACTATED RINGERS PER 1000 ML: Performed by: STUDENT IN AN ORGANIZED HEALTH CARE EDUCATION/TRAINING PROGRAM

## 2024-01-07 RX ORDER — AMLODIPINE BESYLATE 5 MG/1
10 TABLET ORAL DAILY
Status: DISCONTINUED | OUTPATIENT
Start: 2024-01-08 | End: 2024-01-08 | Stop reason: HOSPADM

## 2024-01-07 RX ORDER — ENOXAPARIN SODIUM 100 MG/ML
40 INJECTION SUBCUTANEOUS EVERY 24 HOURS
Status: DISCONTINUED | OUTPATIENT
Start: 2024-01-07 | End: 2024-01-08 | Stop reason: HOSPADM

## 2024-01-07 RX ORDER — PREDNISONE 20 MG/1
20 TABLET ORAL
Status: DISCONTINUED | OUTPATIENT
Start: 2024-01-08 | End: 2024-01-08 | Stop reason: HOSPADM

## 2024-01-07 RX ADMIN — ENOXAPARIN SODIUM 40 MG: 100 INJECTION SUBCUTANEOUS at 17:00

## 2024-01-07 RX ADMIN — GUAIFENESIN 1200 MG: 600 TABLET, MULTILAYER, EXTENDED RELEASE ORAL at 09:04

## 2024-01-07 RX ADMIN — ATORVASTATIN CALCIUM 10 MG: 10 TABLET, FILM COATED ORAL at 09:04

## 2024-01-07 RX ADMIN — BUDESONIDE INHALATION 0.5 MG: 0.5 SUSPENSION RESPIRATORY (INHALATION) at 18:50

## 2024-01-07 RX ADMIN — CEFTRIAXONE 2000 MG: 2 INJECTION, POWDER, FOR SOLUTION INTRAMUSCULAR; INTRAVENOUS at 17:00

## 2024-01-07 RX ADMIN — METHYLPREDNISOLONE SODIUM SUCCINATE 40 MG: 40 INJECTION, POWDER, FOR SOLUTION INTRAMUSCULAR; INTRAVENOUS at 09:04

## 2024-01-07 RX ADMIN — BUDESONIDE INHALATION 0.5 MG: 0.5 SUSPENSION RESPIRATORY (INHALATION) at 08:45

## 2024-01-07 RX ADMIN — AMLODIPINE BESYLATE 5 MG: 5 TABLET ORAL at 09:05

## 2024-01-07 RX ADMIN — SODIUM CHLORIDE, POTASSIUM CHLORIDE, SODIUM LACTATE AND CALCIUM CHLORIDE 75 ML/HR: 600; 310; 30; 20 INJECTION, SOLUTION INTRAVENOUS at 05:33

## 2024-01-07 RX ADMIN — METOPROLOL SUCCINATE 50 MG: 50 TABLET, EXTENDED RELEASE ORAL at 09:04

## 2024-01-07 RX ADMIN — Medication 10 ML: at 09:04

## 2024-01-07 RX ADMIN — GUAIFENESIN 1200 MG: 600 TABLET, MULTILAYER, EXTENDED RELEASE ORAL at 20:17

## 2024-01-07 RX ADMIN — Medication 10 ML: at 20:17

## 2024-01-07 RX ADMIN — HYDRALAZINE HYDROCHLORIDE 10 MG: 20 INJECTION, SOLUTION INTRAMUSCULAR; INTRAVENOUS at 05:29

## 2024-01-07 NOTE — PROGRESS NOTES
Daily Progress Note          Assessment    Bilateral pneumonia with tree-in-bud pattern and areas of consolidation  Multiple mucous plugs  Hypoxemia  COPD  Tobacco smoking  Leukocytosis     Recommendations:  Status post bronchoscopy 1/5/2024 with removal of multiple white-colored mucous plugs: Monitor results, so for normal sp    Leukocytosis is noted, clinically patient is improving, likely due to steroids  antibiotics: Rocephin  IV steroids discontinue, start prednisone 20 mg daily for 5 days    Patient will need follow-up CT scan of the chest in 4-6 weeks to ensure resolution of the infiltrates  Oxygen supplement and titration to maintain saturation 90 to 95%: Currently requiring 3 L per nasal cannula    Bronchodilators  Inhaled corticosteroids  Mucinex  Smoking cessation counseling  DVT prophylaxis      I personally reviewed the radiological studies               LOS: 5 days     Subjective     Patient reports cough and shortness of breath    Objective     Vital signs for last 24 hours:  Vitals:    01/07/24 0310 01/07/24 0500 01/07/24 0845 01/07/24 0851   BP: (!) 187/83 170/83     BP Location:       Patient Position:       Pulse: 102  105 104   Resp: 20  19 20   Temp: 97.5 °F (36.4 °C)      TempSrc:       SpO2: 95%  93% 94%   Weight:       Height:           Intake/Output last 3 shifts:  I/O last 3 completed shifts:  In: 2140 [P.O.:1440; I.V.:700]  Out: 1700 [Urine:1700]  Intake/Output this shift:  I/O this shift:  In: -   Out: 175 [Urine:175]      Radiology  Imaging Results (Last 24 Hours)       Procedure Component Value Units Date/Time    XR Chest 1 View [[id number]] Collected: 01/06/24 0943     Updated: 01/06/24 0945    Narrative:      XR CHEST 1 VW    Date of Exam: 1/6/2024 9:20 AM EST    Indication: shortness of air    Comparison: 1/2/2024    FINDINGS:  No consolidations or pleural effusions are observed. The cardiac silhouette is within normal limits for size. The mediastinum is unremarkable. No acute  osseous abnormalities are identified on this single view.      Impression:      1.No evidence for acute cardiopulmonary process.      Electronically Signed: Gigi Marvin MD    1/6/2024 9:43 AM EST    Workstation ID: [id number]            Labs:  Results from last 7 days   Lab Units 01/07/24  0625   WBC 10*3/mm3 40.30*   HEMOGLOBIN g/dL 14.4   HEMATOCRIT % 44.7   PLATELETS 10*3/mm3 424     Results from last 7 days   Lab Units 01/05/24  0451 01/03/24  0106 01/02/24  1541   SODIUM mmol/L 141   < > 137   POTASSIUM mmol/L 4.2   < > 3.4*   CHLORIDE mmol/L 104   < > 96*   CO2 mmol/L 27.0   < > 33.2*   BUN mg/dL 11   < > 21   CREATININE mg/dL 0.68*   < > 0.97   CALCIUM mg/dL 8.7   < > 9.0   BILIRUBIN mg/dL  --   --  0.7   ALK PHOS U/L  --   --  273*   ALT (SGPT) U/L  --   --  56*   AST (SGOT) U/L  --   --  37   GLUCOSE mg/dL 104*   < > 118*    < > = values in this interval not displayed.         Results from last 7 days   Lab Units 01/02/24  1541   ALBUMIN g/dL 3.5     Results from last 7 days   Lab Units 01/02/24  1755 01/02/24  1541   HSTROP T ng/L 14 15  15             Results from last 7 days   Lab Units 01/03/24  0106   INR  1.14*               Meds:   SCHEDULE  amLODIPine, 5 mg, Oral, Daily  atorvastatin, 10 mg, Oral, Daily  budesonide, 0.5 mg, Nebulization, BID - RT  cefTRIAXone (ROCEPHIN) 2,000 mg in sodium chloride 0.9 % 100 mL IVPB, 2,000 mg, Intravenous, Q24H  guaiFENesin, 1,200 mg, Oral, Q12H  methylPREDNISolone sodium succinate, 40 mg, Intravenous, Q12H  metoprolol succinate XL, 50 mg, Oral, Daily  senna-docusate sodium, 2 tablet, Oral, BID  sodium chloride, 10 mL, Intravenous, Q12H      Infusions  lactated ringers, 75 mL/hr, Last Rate: 75 mL/hr (01/07/24 0533)      PRNs    senna-docusate sodium **AND** polyethylene glycol **AND** bisacodyl **AND** bisacodyl    Calcium Replacement - Follow Nurse / BPA Driven Protocol    hydrALAZINE    ipratropium-albuterol    Magnesium Standard Dose Replacement - Follow Nurse  / BPA Driven Protocol    nitroglycerin    ondansetron ODT **OR** ondansetron    Phosphorus Replacement - Follow Nurse / BPA Driven Protocol    Potassium Replacement - Follow Nurse / BPA Driven Protocol    sodium chloride    sodium chloride    Physical Exam:  General Appearance:  Alert   HEENT:  Normocephalic, without obvious abnormality, Conjunctiva/corneas clear,.   Nares normal, no drainage     Neck:  Supple, symmetrical, trachea midline.   Lungs /Chest wall: Distant breath sounds with bilateral basal rhonchi, respirations unlabored, symmetrical wall movement.     Heart:  Regular rate and rhythm, S1 S2 normal  Abdomen: Soft, non-tender, no masses, no organomegaly.    Extremities: No edema, no clubbing or cyanosis     ROS  Constitutional: Negative for chills, fever and malaise/fatigue.   HENT: Negative.    Eyes: Negative.    Cardiovascular: Negative.    Respiratory: Positive for cough and shortness of breath.    Skin: Negative.    Musculoskeletal: Negative.    Gastrointestinal: Negative.    Genitourinary: Negative.    Neurological: Negative.    Psychiatric/Behavioral: Negative.      I reviewed the recent clinical results  I personally reviewed the latest radiological studies    Part of this note may be an electronic transcription/translation of spoken language to printed text using the Dragon Dictation System.

## 2024-01-07 NOTE — PROGRESS NOTES
[facility] MEDICINE SERVICE  DAILY PROGRESS NOTE    NAME: Lizz Powell  : 1945  MRN: [id number]      LOS: 5 days     PROVIDER OF SERVICE: Lisandro Fuchs MD    Chief Complaint: Bilateral pneumonia    Subjective:     Interval History:  History taken from: patient    Reported breathing is slightly better has been having some cough going on.    Review of Systems:   Review of Systems   Respiratory:  Positive for cough and shortness of breath.        Objective:     Vital Signs  Temp:  [97.5 °F (36.4 °C)-97.8 °F (36.6 °C)] 97.5 °F (36.4 °C)  Heart Rate:  [] 104  Resp:  [18-24] 20  BP: (150-187)/(65-83) 170/83  Flow (L/min):  [3] 3   Body mass index is 29.01 kg/m².    Physical Exam  Physical Exam  Vitals and nursing note reviewed.   Constitutional:       Appearance: He is well-developed.   HENT:      Head: Normocephalic.   Cardiovascular:      Rate and Rhythm: Normal rate and regular rhythm.      Heart sounds: Normal heart sounds. No murmur heard.     No friction rub. No gallop.   Pulmonary:      Breath sounds: No stridor.   Abdominal:      General: There is no distension.      Palpations: Abdomen is soft. There is no mass.      Tenderness: There is no abdominal tenderness. There is no guarding.      Hernia: No hernia is present.   Musculoskeletal:         General: No deformity.   Skin:     General: Skin is warm and dry.      Findings: No erythema.   Psychiatric:         Behavior: Behavior normal.         Scheduled Meds   [START ON 2024] amLODIPine, 10 mg, Oral, Daily  atorvastatin, 10 mg, Oral, Daily  budesonide, 0.5 mg, Nebulization, BID - RT  cefTRIAXone (ROCEPHIN) 2,000 mg in sodium chloride 0.9 % 100 mL IVPB, 2,000 mg, Intravenous, Q24H  enoxaparin, 40 mg, Subcutaneous, Q24H  guaiFENesin, 1,200 mg, Oral, Q12H  metoprolol succinate XL, 50 mg, Oral, Daily  [START ON 2024] predniSONE, 20 mg, Oral, Daily With Breakfast  senna-docusate sodium, 2 tablet, Oral, BID  sodium chloride, 10 mL,  Intravenous, Q12H       PRN Meds     senna-docusate sodium **AND** polyethylene glycol **AND** bisacodyl **AND** bisacodyl    Calcium Replacement - Follow Nurse / BPA Driven Protocol    hydrALAZINE    ipratropium-albuterol    Magnesium Standard Dose Replacement - Follow Nurse / BPA Driven Protocol    nitroglycerin    ondansetron ODT **OR** ondansetron    Phosphorus Replacement - Follow Nurse / BPA Driven Protocol    Potassium Replacement - Follow Nurse / BPA Driven Protocol    sodium chloride    sodium chloride   Infusions  lactated ringers, 75 mL/hr, Last Rate: 75 mL/hr (01/07/24 0533)          Diagnostic Data    Results from last 7 days   Lab Units 01/07/24  0625 01/05/24  0511 01/05/24  0451 01/03/24  0106 01/02/24  1541   WBC 10*3/mm3 40.30*   < >  --    < > 29.50*   HEMOGLOBIN g/dL 14.4   < >  --    < > 15.4   HEMATOCRIT % 44.7   < >  --    < > 48.4   PLATELETS 10*3/mm3 424   < >  --    < > 413   GLUCOSE mg/dL  --   --  104*   < > 118*   CREATININE mg/dL  --   --  0.68*   < > 0.97   BUN mg/dL  --   --  11   < > 21   SODIUM mmol/L  --   --  141   < > 137   POTASSIUM mmol/L  --   --  4.2   < > 3.4*   AST (SGOT) U/L  --   --   --   --  37   ALT (SGPT) U/L  --   --   --   --  56*   ALK PHOS U/L  --   --   --   --  273*   BILIRUBIN mg/dL  --   --   --   --  0.7   ANION GAP mmol/L  --   --  10.0   < > 7.8    < > = values in this interval not displayed.       XR Chest 1 View    Result Date: 1/6/2024  1.No evidence for acute cardiopulmonary process. Electronically Signed: Gigi Marvin MD  1/6/2024 9:43 AM EST  Workstation ID: [id number]       I reviewed the patient's new clinical results.    Assessment/Plan:     Active and Resolved Problems  Active Hospital Problems    Diagnosis  POA    **Bilateral pneumonia [J18.9]  Yes    Multiple tracheobronchial mucus plugs [T17.800A]  Unknown      Resolved Hospital Problems   No resolved problems to display.     #Multi-focal Pneumonia  #Respiratory insufficiency    - Patient 87%  on Room air at , placed on 2L NC    - CXR shows bilateral airspace opacities L>R and small left pleural effusion with adenopathy.    - CT PE     - covid/flu negative    - strep, legionella, sputum    - blood cultures    - Rocephin 2g IV daily    - Azithromycin 500mg IV x 3 days    - pulm consulted, suspect will need bronch to rule out neoplasm    - NPO    - LR @ 75/hr    - Lactate 1.5, monitor    - HS troponin 15 > 14, doubtful ACS    - proBNP 179.3, doubt fluid overload  Pulmonology also managing, we will continue to monitor continue to follow cultures.  Continue with IV antibiotics  Bronchoscopy done today, we will continue to monitor.  Continue with IV antibiotic and continue to follow cultures    Chest x-ray repeated yesterday, no significant changes.     #COPD    - Duoneb QID    - Pulmicort BID    - hold off IV steroids at this time  Continue with nebulizer treatments  Continue to wean oxygen as tolerated.  Continue to wean oxygen as tolerated currently on 3 L.  He does not wear home oxygen     #Hypokalemia    - replace per protocol     #Tobacco use disorder    - cessation recommended      DVT prophylaxis:  Medical and mechanical DVT prophylaxis orders are present.     Code status is   Code Status and Medical Interventions:   Ordered at: 01/02/24 2120     Code Status (Patient has no pulse and is not breathing):    CPR (Attempt to Resuscitate)     Medical Interventions (Patient has pulse or is breathing):    Full Support       Plan for disposition: Home in 1-2 days    Time: 30 minutes    Signature: Electronically signed by Lisandro Fuchs MD, 01/07/24, 10:11 EST.  [facility] Hospitalist Team

## 2024-01-07 NOTE — PLAN OF CARE
Goal Outcome Evaluation:      Patient alert oriented able to make needs known. Patient has had no complaints of pain this shift. Patient reports shortness of breath with activity. On oxygen 3L/NC.Infrequent tight cough. Patient blood pressure has been elevated this shift. NP Alicia notified. PRN order if blood pressure over 170. Bed in low position, call light in reach, room near nursing station.

## 2024-01-08 ENCOUNTER — READMISSION MANAGEMENT (OUTPATIENT)
Dept: CALL CENTER | Facility: HOSPITAL | Age: 79
End: 2024-01-08
Payer: MEDICARE

## 2024-01-08 VITALS
BODY MASS INDEX: 29.09 KG/M2 | DIASTOLIC BLOOD PRESSURE: 72 MMHG | HEART RATE: 83 BPM | OXYGEN SATURATION: 93 % | TEMPERATURE: 97.2 F | HEIGHT: 69 IN | SYSTOLIC BLOOD PRESSURE: 135 MMHG | RESPIRATION RATE: 17 BRPM | WEIGHT: 196.43 LBS

## 2024-01-08 PROBLEM — J18.9 PNEUMONIA, UNSPECIFIED ORGANISM: Status: ACTIVE | Noted: 2024-01-08

## 2024-01-08 PROBLEM — T17.800A MULTIPLE TRACHEOBRONCHIAL MUCUS PLUGS: Status: RESOLVED | Noted: 2024-01-02 | Resolved: 2024-01-08

## 2024-01-08 PROBLEM — J18.9 BILATERAL PNEUMONIA: Status: RESOLVED | Noted: 2024-01-02 | Resolved: 2024-01-08

## 2024-01-08 LAB
DEPRECATED RDW RBC AUTO: 48.1 FL (ref 37–54)
ERYTHROCYTE [DISTWIDTH] IN BLOOD BY AUTOMATED COUNT: 14.6 % (ref 12.3–15.4)
HCT VFR BLD AUTO: 43.9 % (ref 37.5–51)
HGB BLD-MCNC: 14.2 G/DL (ref 13–17.7)
LAB AP CASE REPORT: NORMAL
MCH RBC QN AUTO: 30.1 PG (ref 26.6–33)
MCHC RBC AUTO-ENTMCNC: 32.4 G/DL (ref 31.5–35.7)
MCV RBC AUTO: 92.8 FL (ref 79–97)
P JIROVECII DNA L RESP QL NAA+NON-PROBE: NEGATIVE
PATH REPORT.FINAL DX SPEC: NORMAL
PATH REPORT.GROSS SPEC: NORMAL
PLATELET # BLD AUTO: 381 10*3/MM3 (ref 140–450)
PMV BLD AUTO: 10.2 FL (ref 6–12)
RBC # BLD AUTO: 4.73 10*6/MM3 (ref 4.14–5.8)
REF LAB TEST METHOD: NORMAL
WBC NRBC COR # BLD AUTO: 27.9 10*3/MM3 (ref 3.4–10.8)

## 2024-01-08 PROCEDURE — 63710000001 PREDNISONE PER 1 MG: Performed by: INTERNAL MEDICINE

## 2024-01-08 PROCEDURE — 85027 COMPLETE CBC AUTOMATED: CPT | Performed by: STUDENT IN AN ORGANIZED HEALTH CARE EDUCATION/TRAINING PROGRAM

## 2024-01-08 PROCEDURE — 94799 UNLISTED PULMONARY SVC/PX: CPT

## 2024-01-08 PROCEDURE — 94618 PULMONARY STRESS TESTING: CPT

## 2024-01-08 RX ORDER — PREDNISONE 20 MG/1
20 TABLET ORAL
Qty: 4 TABLET | Refills: 0 | Status: SHIPPED | OUTPATIENT
Start: 2024-01-09 | End: 2024-01-13

## 2024-01-08 RX ADMIN — METOPROLOL SUCCINATE 50 MG: 50 TABLET, EXTENDED RELEASE ORAL at 08:48

## 2024-01-08 RX ADMIN — PREDNISONE 20 MG: 20 TABLET ORAL at 08:48

## 2024-01-08 RX ADMIN — DOCUSATE SODIUM AND SENNOSIDES 2 TABLET: 8.6; 5 TABLET, FILM COATED ORAL at 08:48

## 2024-01-08 RX ADMIN — AMLODIPINE BESYLATE 10 MG: 5 TABLET ORAL at 08:48

## 2024-01-08 RX ADMIN — GUAIFENESIN 1200 MG: 600 TABLET, MULTILAYER, EXTENDED RELEASE ORAL at 08:48

## 2024-01-08 RX ADMIN — ATORVASTATIN CALCIUM 10 MG: 10 TABLET, FILM COATED ORAL at 08:48

## 2024-01-08 RX ADMIN — Medication 10 ML: at 08:49

## 2024-01-08 NOTE — PLAN OF CARE
Goal Outcome Evaluation:              Outcome Evaluation: Pt stable with no complaints of pain. VSS, 3L nasal cannula, IV abx. Incentive spirometer at bedside. Call light within reach, bed in lowest position, able to make needs known. Plan of care ongoing.

## 2024-01-08 NOTE — DISCHARGE SUMMARY
[facility] Medicine Services  DISCHARGE SUMMARY    Patient Name: Lizz Powell  : 1945  MRN: [id number]    Date of Admission: 2024  Discharge Diagnosis: Community-acquired pneumonia/acute exacerbation of COPD  Date of Discharge: 2024  Primary Care Physician: Provider, No Known      Presenting Problem:   Bilateral pneumonia [J18.9]  COPD with pneumonia [J44.0, J18.9]    Active and Resolved Hospital Problems:  Active Hospital Problems    Diagnosis POA    Pneumonia, unspecified organism [J18.9] Yes      Resolved Hospital Problems    Diagnosis POA    **Bilateral pneumonia [J18.9] Yes    Multiple tracheobronchial mucus plugs [T17.800A] Unknown         Hospital Course     Hospital Course:  Lizz Powell is a 78 y.o. male     78-year-old male with PMH of COPD admitted for evaluation management of acute hypoxic respiratory failure in setting of multifocal pneumonia and acute exacerbation of COPD.  Patient was evaluated by pulmonology.  On presentation patient with chest x-ray showing bilateral space opacities with pleural effusion.  Patient underwent bronchoscopy with noted multiple mucous plugs which was treated as per pulmonology.  Patient status post ceftriaxone and azithromycin course and currently on prednisone which will be continued at discharge for 5 days.  Unfortunately patient requiring oxygen supplementation during his hospitalization and will continue to require and will be discharged on home O2 at 3 L/min.  Patient will follow-up with his home PCP in [place].  Patient in hemodynamically stable condition for discharge home      DISCHARGE Follow Up Recommendations for labs and diagnostics: As above      Reasons For Change In Medications and Indications for New Medications:      Day of Discharge     Vital Signs:  Temp:  [97.2 °F (36.2 °C)-97.5 °F (36.4 °C)] 97.2 °F (36.2 °C)  Heart Rate:  [73-91] 83  Resp:  [17-19] 17  BP: (135-160)/(70-75) 135/72  Flow (L/min):  [2-3]  2    Physical Exam:  Physical Exam  Constitutional:       Appearance: Normal appearance.   HENT:      Mouth/Throat:      Mouth: Mucous membranes are moist.   Eyes:      Extraocular Movements: Extraocular movements intact.      Pupils: Pupils are equal, round, and reactive to light.   Cardiovascular:      Rate and Rhythm: Normal rate and regular rhythm.   Pulmonary:      Effort: Pulmonary effort is normal.      Breath sounds: Wheezing present.      Comments: On nasal cannula  Abdominal:      General: Bowel sounds are normal.      Palpations: Abdomen is soft.   Neurological:      General: No focal deficit present.      Mental Status: He is alert and oriented to person, place, and time.            Pertinent  and/or Most Recent Results     LAB RESULTS:      Lab 01/08/24  0146 01/07/24  0625 01/05/24  2329 01/05/24  0511 01/04/24  0048 01/03/24  0106 01/02/24  1541   WBC 27.90* 40.30* 26.80* 19.00* 20.40* 28.80* 29.50*   HEMOGLOBIN 14.2 14.4 12.8* 13.2 12.6* 13.8 15.4   HEMATOCRIT 43.9 44.7 39.2 40.4 38.7 42.1 48.4   PLATELETS 381 424 349 381 371 397 413   NEUTROS ABS  --   --   --   --   --   --  24.19*   MCV 92.8 93.2 91.0 92.7 93.2 92.5 94.0   LACTATE  --   --   --   --   --   --  1.5   PROTIME  --   --   --   --   --  12.3*  --          Lab 01/05/24  0451 01/04/24  0048 01/03/24  0106 01/02/24  1541   SODIUM 141 140 139 137   POTASSIUM 4.2 3.8 3.6 3.4*   CHLORIDE 104 102 97* 96*   CO2 27.0 28.0 28.0 33.2*   ANION GAP 10.0 10.0 14.0 7.8   BUN 11 19 26* 21   CREATININE 0.68* 0.73* 0.96 0.97   EGFR 95.1 93.1 80.9 79.9   GLUCOSE 104* 102* 109* 118*   CALCIUM 8.7 8.6 8.8 9.0         Lab 01/02/24  1541   TOTAL PROTEIN 7.7   ALBUMIN 3.5   GLOBULIN 4.2   ALT (SGPT) 56*   AST (SGOT) 37   BILIRUBIN 0.7   ALK PHOS 273*         Lab 01/03/24  0106 01/02/24  1755 01/02/24  1541   PROBNP  --   --  179.3   HSTROP T  --  14 15  15   PROTIME 12.3*  --   --    INR 1.14*  --   --                  Brief Urine Lab Results       None           Microbiology Results (last 10 days)       Procedure Component Value - Date/Time    AFB Culture - Lavage, Lung, Right Lower Lobe [[id number]] Collected: 01/05/24 0814    Lab Status: Preliminary result Specimen: Lavage from Lung, Right Lower Lobe Updated: 01/05/24 1345     AFB Stain No acid fast bacilli seen on concentrated smear    BAL Culture, Quantitative - Lavage, Lung, Right Lower Lobe [[id number]] Collected: 01/05/24 0814    Lab Status: Final result Specimen: Lavage from Lung, Right Lower Lobe Updated: 01/07/24 0947     BAL Culture 25,000 CFU/mL Normal respiratory sp. No S. aureus or Pseudomonas aeruginosa detected. Final report.     Gram Stain Many (4+) WBCs seen      Rare (1+) Mixed bacterial morphotypes seen on Gram Stain    Pneumocystis PCR - Lavage, Lung, Right Lower Lobe [[id number]] Collected: 01/05/24 0814    Lab Status: Final result Specimen: Lavage from Lung, Right Lower Lobe Updated: 01/08/24 1347     Reference Lab Report See Attached Report     Pneumocystis Negative    Respiratory Culture - Sputum, Cough [[id number]] Collected: 01/04/24 0847    Lab Status: Final result Specimen: Sputum from Cough Updated: 01/06/24 1114     Respiratory Culture Light growth (2+) Normal respiratory sp. No S. aureus or Pseudomonas aeruginosa detected. Final report.     Gram Stain Many (4+) WBCs per low power field      Rare (1+) Epithelial cells per low power field      Few (2+) Mixed bacterial morphotypes seen on Gram Stain    Narrative:            S. Pneumo Ag Urine or CSF - Urine, Urine, Clean Catch [[id number]]  (Normal) Collected: 01/03/24 0206    Lab Status: Final result Specimen: Urine, Clean Catch Updated: 01/03/24 0228     Strep Pneumo Ag Negative    Legionella Antigen, Urine - Urine, Urine, Clean Catch [[id number]]  (Normal) Collected: 01/03/24 0206    Lab Status: Final result Specimen: Urine, Clean Catch Updated: 01/03/24 0228     LEGIONELLA ANTIGEN, URINE Negative    Blood Culture - Blood, Arm, Right  [[id number]]  (Normal) Collected: 01/02/24 1606    Lab Status: Final result Specimen: Blood from Arm, Right Updated: 01/07/24 1616     Blood Culture No growth at 5 days    Blood Culture - Blood, Arm, Left [[id number]]  (Normal) Collected: 01/02/24 1541    Lab Status: Final result Specimen: Blood from Arm, Left Updated: 01/07/24 1616     Blood Culture No growth at 5 days    RSV PCR - Swab, Nasopharynx [[id number]]  (Normal) Collected: 01/02/24 1505    Lab Status: Final result Specimen: Swab from Nasopharynx Updated: 01/02/24 1525     RSV, PCR Not Detected    COVID-19 and FLU A/B PCR, 1 HR TAT - Swab, Nasopharynx [[id number]]  (Normal) Collected: 01/02/24 1444    Lab Status: Final result Specimen: Swab from Nasopharynx Updated: 01/02/24 1503     COVID19 Not Detected     Influenza A PCR Not Detected     Influenza B PCR Not Detected    Narrative:      Fact sheet for providers: https://www.fda.gov/media/642163/download    Fact sheet for patients: https://www.fda.gov/media/952391/download    Test performed by PCR.            XR Chest 1 View    Result Date: 1/6/2024  Impression: 1.No evidence for acute cardiopulmonary process. Electronically Signed: Gigi Marvin MD  1/6/2024 9:43 AM EST  Workstation ID: [id number]    CT Angiogram Chest Pulmonary Embolism    Result Date: 1/3/2024  Impression: Impression: 1.No evidence of pulmonary embolism. 2.There is diffuse peribronchial thickening with segmentally obstructive endobronchial debris and extensive tree-in-bud nodules scattered throughout both lungs. Findings are concerning for pneumonia and infectious bronchiolitis 3.Mild coronary artery calcification. Electronically Signed: Danilo Alexander MD  1/3/2024 1:56 AM EST  Workstation ID: [id number]    XR Chest 1 View    Result Date: 1/2/2024  Impression: Impression: 1. Bilateral perihilar airspace and interstitial opacities, left greater than right, suggesting atypical infection pattern or edema. Small left pleural effusion. 2. There is  prominence of the right pulmonary hilum which may be due to adenopathy. This may be reactive given the pulmonary findings. Attention on follow-up recommended to rule out neoplasm. Electronically Signed: Brock Ramon MD  1/2/2024 3:10 PM EST  Workstation ID: [id number]                 Labs Pending at Discharge:  Pending Labs       Order Current Status    Fungus Culture - Lavage, Lung, Right Lower Lobe In process    Virus Culture - Lavage, Lung, Right Lower Lobe In process    AFB Culture - Lavage, Lung, Right Lower Lobe Preliminary result            Procedures Performed  Procedure(s):  BRONCHOSCOPY with bronchoalveolar lavage         Consults:   Consults       Date and Time Order Name Status Description    1/2/2024 10:25 PM Inpatient Pulmonology Consult Completed               Discharge Details        Discharge Medications        New Medications        Instructions Start Date   predniSONE 20 MG tablet  Commonly known as: DELTASONE   20 mg, Oral, Daily With Breakfast   Start Date: January 9, 2024            Continue These Medications        Instructions Start Date   albuterol sulfate  (90 Base) MCG/ACT inhaler  Commonly known as: PROVENTIL HFA;VENTOLIN HFA;PROAIR HFA   2 puffs, Inhalation, Every 4 Hours PRN      amLODIPine 5 MG tablet  Commonly known as: NORVASC   5 mg, Oral, Daily      Fluticasone Furoate-Vilanterol 200-25 MCG/ACT inhaler  Commonly known as: BREO ELLIPTA   1 puff, Inhalation, Daily - RT      metoprolol succinate XL 50 MG 24 hr tablet  Commonly known as: TOPROL-XL   50 mg, Oral, Daily      pravastatin 20 MG tablet  Commonly known as: PRAVACHOL   20 mg, Oral, Daily               No Known Allergies      Discharge Disposition: Home  Home or Self Care    Diet:  Hospital:  Diet Order   Procedures    Diet: Regular/House Diet; Texture: Regular Texture (IDDSI 7); Fluid Consistency: Thin (IDDSI 0)         Discharge Activity:         CODE STATUS:  Code Status and Medical Interventions:   Ordered at:  01/02/24 2120     Code Status (Patient has no pulse and is not breathing):    CPR (Attempt to Resuscitate)     Medical Interventions (Patient has pulse or is breathing):    Full Support         No future appointments.        Time spent on Discharge including face to face service:  >30 minutes          Signature: Shant Hsu MD

## 2024-01-08 NOTE — PROGRESS NOTES
Daily Progress Note          Assessment    Bilateral pneumonia with tree-in-bud pattern and areas of consolidation  Multiple mucous plugs  Hypoxemia  COPD  Tobacco smoking  Leukocytosis     Recommendations:  Status post bronchoscopy 1/5/2024 with removal of multiple white-colored mucous plugs: Monitor results, so for normal sp    Leukocytosis is trending down after decreasing steroids  antibiotics: Rocephin  prednisone 20 mg daily for 5 days    Patient will need follow-up CT scan of the chest in 4-6 weeks to ensure resolution of the infiltrates  Oxygen supplement and titration to maintain saturation 90 to 95%: Currently requiring 2 L per nasal cannula    Bronchodilators  Inhaled corticosteroids  Mucinex  Smoking cessation counseling  DVT prophylaxis      I personally reviewed the radiological studies               LOS: 6 days     Subjective     Patient reports cough and shortness of breath    Objective     Vital signs for last 24 hours:  Vitals:    01/07/24 1850 01/07/24 1853 01/07/24 2104 01/08/24 0453   BP:   160/70 157/75   BP Location:   Right arm Right arm   Patient Position:   Sitting Lying   Pulse: 90 90 91 76   Resp: 18 18 17 18   Temp:   97.4 °F (36.3 °C) 97.5 °F (36.4 °C)   TempSrc:   Oral Oral   SpO2: 97% 97% 93% 98%   Weight:       Height:           Intake/Output last 3 shifts:  I/O last 3 completed shifts:  In: 2020 [P.O.:1320; I.V.:700]  Out: 1900 [Urine:1900]  Intake/Output this shift:  No intake/output data recorded.      Radiology  Imaging Results (Last 24 Hours)       ** No results found for the last 24 hours. **            Labs:  Results from last 7 days   Lab Units 01/08/24  0146   WBC 10*3/mm3 27.90*   HEMOGLOBIN g/dL 14.2   HEMATOCRIT % 43.9   PLATELETS 10*3/mm3 381     Results from last 7 days   Lab Units 01/05/24  0451 01/03/24  0106 01/02/24  1541   SODIUM mmol/L 141   < > 137   POTASSIUM mmol/L 4.2   < > 3.4*   CHLORIDE mmol/L 104   < > 96*   CO2 mmol/L 27.0   < > 33.2*   BUN mg/dL 11    < > 21   CREATININE mg/dL 0.68*   < > 0.97   CALCIUM mg/dL 8.7   < > 9.0   BILIRUBIN mg/dL  --   --  0.7   ALK PHOS U/L  --   --  273*   ALT (SGPT) U/L  --   --  56*   AST (SGOT) U/L  --   --  37   GLUCOSE mg/dL 104*   < > 118*    < > = values in this interval not displayed.         Results from last 7 days   Lab Units 01/02/24  1541   ALBUMIN g/dL 3.5     Results from last 7 days   Lab Units 01/02/24  1755 01/02/24  1541   HSTROP T ng/L 14 15  15             Results from last 7 days   Lab Units 01/03/24  0106   INR  1.14*               Meds:   SCHEDULE  amLODIPine, 10 mg, Oral, Daily  atorvastatin, 10 mg, Oral, Daily  budesonide, 0.5 mg, Nebulization, BID - RT  cefTRIAXone (ROCEPHIN) 2,000 mg in sodium chloride 0.9 % 100 mL IVPB, 2,000 mg, Intravenous, Q24H  enoxaparin, 40 mg, Subcutaneous, Q24H  guaiFENesin, 1,200 mg, Oral, Q12H  metoprolol succinate XL, 50 mg, Oral, Daily  predniSONE, 20 mg, Oral, Daily With Breakfast  senna-docusate sodium, 2 tablet, Oral, BID  sodium chloride, 10 mL, Intravenous, Q12H      Infusions  lactated ringers, 75 mL/hr, Last Rate: 75 mL/hr (01/07/24 0533)      PRNs    senna-docusate sodium **AND** polyethylene glycol **AND** bisacodyl **AND** bisacodyl    Calcium Replacement - Follow Nurse / BPA Driven Protocol    hydrALAZINE    ipratropium-albuterol    Magnesium Standard Dose Replacement - Follow Nurse / BPA Driven Protocol    nitroglycerin    ondansetron ODT **OR** ondansetron    Phosphorus Replacement - Follow Nurse / BPA Driven Protocol    Potassium Replacement - Follow Nurse / BPA Driven Protocol    sodium chloride    sodium chloride    Physical Exam:  General Appearance:  Alert   HEENT:  Normocephalic, without obvious abnormality, Conjunctiva/corneas clear,.   Nares normal, no drainage     Neck:  Supple, symmetrical, trachea midline.   Lungs /Chest wall: Distant breath sounds with bilateral basal rhonchi, respirations unlabored, symmetrical wall movement.     Heart:  Regular rate  and rhythm, S1 S2 normal  Abdomen: Soft, non-tender, no masses, no organomegaly.    Extremities: No edema, no clubbing or cyanosis     ROS  Constitutional: Negative for chills, fever and malaise/fatigue.   HENT: Negative.    Eyes: Negative.    Cardiovascular: Negative.    Respiratory: Positive for cough and shortness of breath.    Skin: Negative.    Musculoskeletal: Negative.    Gastrointestinal: Negative.    Genitourinary: Negative.    Neurological: Negative.    Psychiatric/Behavioral: Negative.      I reviewed the recent clinical results  I personally reviewed the latest radiological studies    Part of this note may be an electronic transcription/translation of spoken language to printed text using the Dragon Dictation System.

## 2024-01-08 NOTE — PLAN OF CARE
Exercise Oximetry    Patient Name:Lizz Powell   MRN: [id number]   Date: 01/08/24             ROOM AIR BASELINE   SpO2%          86%   Heart Rate     83 bpm   Blood Pressure      EXERCISE ON ROOM AIR SpO2% EXERCISE ON O2 @  LPM SpO2%   1 MINUTE  1 MINUTE       on 1 Lpm 86%   2 MINUTES  2 MINUTES     on 2 Lpm 87%   3 MINUTES  3 MINUTES     on 3 Lpm 88%   4 MINUTES  4 MINUTES     on 3 Lpm 89%   5 MINUTES  5 MINUTES     on 3 Lpm 89%   6 MINUTES  6 MINUTES     on 3 Lpm 90%              Distance Walked   Distance Walked                 6 Minutes   Dyspnea (Sukumar Scale)   Dyspnea (Sukumar Scale)   Fatigue (Sukumar Scale)   Fatigue (Sukumar Scale)   SpO2% Post Exercise   SpO2% Post Exercise             94%   HR Post Exercise   HR Post Exercise                    89 bpm   Time to Recovery   Time to Recovery                    5 Minutes     Comments: Goal Outcome Evaluation: The patient walked for a total of 6 minutes. After 3 minutes, the patient reported shortness of breath. The patient had to stop for 2 minutes and catch his breath. The patient also stated that he felt mild fatigue during the duration of the walk. The patient required 3 Lpm during the walking oximetry to maintain his SpO2 equal to or greater than 88%.

## 2024-01-08 NOTE — CASE MANAGEMENT/SOCIAL WORK
Continued Stay Note  LULU Trevino     Patient Name: Lizz Powell  MRN: [id number]  Today's Date: 1/8/2024    Admit Date: 1/2/2024    Plan: Plan to return home in [place], can stay at daughter's home locally if need. Watch for O2 needs.   Discharge Plan       Row Name 01/08/24 1508       Plan    Plan Comments Faxed RT note, O2 order and DC Summary to [facility] [place], referral in [employer] [facility]. Spoke with Mike at [facility] [place], will deliver O2 to bedside today.      Row Name 01/08/24 1250       Plan    Plan Plan to return home in [place], can stay at daughter's home locally if need. Watch for O2 needs.    Plan Comments Plan to return home in [place], can stay at daughter's home locally if need. Watch for O2 needs. DC Barrier:   6 min O2 walk today                   Discharge Codes    No documentation.                 Expected Discharge Date and Time       Expected Discharge Date Expected Discharge Time    Jan 8, 2024               Frank Santos RN

## 2024-01-08 NOTE — CASE MANAGEMENT/SOCIAL WORK
Continued Stay Note   Uriel     Patient Name: Lizz Powell  MRN: [id number]  Today's Date: 1/8/2024    Admit Date: 1/2/2024    Plan: Plan to return home in [place], can stay at daughter's home locally if need. Watch for O2 needs.   Discharge Plan       Row Name 01/08/24 1250       Plan    Plan Plan to return home in [place], can stay at daughter's home locally if need. Watch for O2 needs.    Plan Comments Plan to return home in [place], can stay at daughter's home locally if need. Watch for O2 needs. DC Barrier:   6 min O2 walk today                 Expected Discharge Date and Time       Expected Discharge Date Expected Discharge Time    Jan 8, 2024             SANCHO Santos RN  S[facility]/ICU   O: [phone]  C: [phone]  [email or web address]

## 2024-01-08 NOTE — DISCHARGE PLACEMENT REQUEST
"Lizz Powell (78 y.o. Male)       Date of Birth   1945    Social Security Number       Address   [address] [facility] [address]    Home Phone       MRN   [id number]       Pentecostalism   None    Marital Status                               Admission Date   1/2/24    Admission Type   Urgent    Admitting Provider   Lilly Quintero DO    Attending Provider   Shant Hsu MD    Department, Room/Bed   [facility], 4122/1       Discharge Date       Discharge Disposition   Home or Self Care    Discharge Destination                                 Attending Provider: Shant Hsu MD    Allergies: No Known Allergies    Isolation: None   Infection: None   Code Status: CPR    Ht: 175.3 cm (69\")   Wt: 89.1 kg (196 lb 6.9 oz)    Admission Cmt: None   Principal Problem: Bilateral pneumonia [J18.9]                   Active Insurance as of 1/2/2024       Primary Coverage       Payor Plan Insurance Group Employer/Plan Group    MEDICARE RAILROAD MEDICARE        Payor Plan Address Payor Plan Phone Number Payor Plan Fax Number Effective Dates    [address] [phone]  5/1/2010 - None Entered    [facility] [id number]         Subscriber Name Subscriber Birth Date Member ID       LIZZ POWELL 1945 [id number]               Secondary Coverage       Payor Plan Insurance Group Employer/Plan Group    [facility] SUPP [id number]       Payor Plan Address Payor Plan Phone Number Payor Plan Fax Number Effective Dates    [address]   1/1/2013 - None Entered    [facility] [address]         Subscriber Name Subscriber Birth Date Member ID       LIZZ POWELL 1945 [id number]                     Emergency Contacts        (Rel.) Home Phone Work Phone Mobile Phone    DAVI POWELL (Daughter) -- -- [phone]          [facility]  [address] [facility] [place] [address]  Dept. Phone:  [phone]  Dept. Fax:  [phone] Date " "Ordered: 2024         Patient:  Lizz Powell MRN:  [id number]   [address] [place] [address] :  1945  SSN:    Phone: [phone] Sex:  M     Weight: 89.1 kg (196 lb 6.9 oz)         Ht Readings from Last 1 Encounters:   24 175.3 cm (69\")               Oxygen Therapy         (Order ID: [id number])    Diagnosis:  COPD with pneumonia (J44.0,J18.9 [ICD-10-CM] 496,486 [ICD-9-CM])   Quantity:  1     Delivery Modality: Nasal Cannula  Liters Per Minute: 3  Duration: Continuous  Equipment:  Oxygen Concentrator &  &  Portable Gaseous Oxygen System & Portable Oxygen Contents Gaseous &  Conserving Regulator  Length of Need: 99 Months = Lifetime        Authorizing Provider's Phone: [phone]  Authorizing Provider:Shant Hsu MD  Authorizing Provider's NPI: [id number]  Order Entered By: Shant Hsu MD 2024  1:54 PM     Electronically signed by: Shant Hsu MD 2024  1:54 PM                 Discharge Summaryl        Ingrid Navas, RT Student at 24 1253          Exercise Oximetry    Patient Name:Lizz Powell   MRN: [id number]   Date: 24             ROOM AIR BASELINE   SpO2%          86%   Heart Rate     83 bpm   Blood Pressure      EXERCISE ON ROOM AIR SpO2% EXERCISE ON O2 @  LPM SpO2%   1 MINUTE  1 MINUTE       on 1 Lpm 86%   2 MINUTES  2 MINUTES     on 2 Lpm 87%   3 MINUTES  3 MINUTES     on 3 Lpm 88%   4 MINUTES  4 MINUTES     on 3 Lpm 89%   5 MINUTES  5 MINUTES     on 3 Lpm 89%   6 MINUTES  6 MINUTES     on 3 Lpm 90%              Distance Walked   Distance Walked                 6 Minutes   Dyspnea (Sukumar Scale)   Dyspnea (Sukumar Scale)   Fatigue (Sukumar Scale)   Fatigue (Sukumar Scale)   SpO2% Post Exercise   SpO2% Post Exercise             94%   HR Post Exercise   HR Post Exercise                    89 bpm   Time to Recovery   Time to Recovery                    5 Minutes     Comments: Goal Outcome Evaluation: The patient walked for a total " of 6 minutes. After 3 minutes, the patient reported shortness of breath. The patient had to stop for 2 minutes and catch his breath. The patient also stated that he felt mild fatigue during the duration of the walk. The patient required 3 Lpm during the walking oximetry to maintain his SpO2 equal to or greater than 88%.                             Electronically signed by Ingrid Navas, RT Student at 01/08/24 1300       Frank Santos RN at 01/08/24 1251          Continued Stay Note  [facility]     Patient Name: Lizz Powell  MRN: [id number]  Today's Date: 1/8/2024    Admit Date: 1/2/2024    Plan: Plan to return home in [place], can stay at daughter's home locally if need. Watch for O2 needs.   Discharge Plan       Row Name 01/08/24 1250       Plan    Plan Plan to return home in [place], can stay at daughter's home locally if need. Watch for O2 needs.    Plan Comments Plan to return home in [place], can stay at daughter's home locally if need. Watch for O2 needs. DC Barrier:   6 min O2 walk today                 Expected Discharge Date and Time       Expected Discharge Date Expected Discharge Time    Jan 8, 2024             SANCHO Santos RN  SIPS/ICU   O: [phone]  C: [phone]  [email or web address]      Electronically signed by Frank Santos RN at 01/08/24 1251       Sheldon Montiel MD at 01/08/24 0751          Daily Progress Note          Assessment    Bilateral pneumonia with tree-in-bud pattern and areas of consolidation  Multiple mucous plugs  Hypoxemia  COPD  Tobacco smoking  Leukocytosis     Recommendations:  Status post bronchoscopy 1/5/2024 with removal of multiple white-colored mucous plugs: Monitor results, so for normal sp    Leukocytosis is trending down after decreasing steroids  antibiotics: Rocephin  prednisone 20 mg daily for 5 days    Patient will need follow-up CT scan of the chest in 4-6 weeks to ensure resolution of the infiltrates  Oxygen supplement and titration to  maintain saturation 90 to 95%: Currently requiring 2 L per nasal cannula    Bronchodilators  Inhaled corticosteroids  Mucinex  Smoking cessation counseling  DVT prophylaxis      I personally reviewed the radiological studies               LOS: 6 days     Subjective    Patient reports cough and shortness of breath    Objective    Vital signs for last 24 hours:  Vitals:    01/07/24 1850 01/07/24 1853 01/07/24 2104 01/08/24 0453   BP:   160/70 157/75   BP Location:   Right arm Right arm   Patient Position:   Sitting Lying   Pulse: 90 90 91 76   Resp: 18 18 17 18   Temp:   97.4 °F (36.3 °C) 97.5 °F (36.4 °C)   TempSrc:   Oral Oral   SpO2: 97% 97% 93% 98%   Weight:       Height:           Intake/Output last 3 shifts:  I/O last 3 completed shifts:  In: 2020 [P.O.:1320; I.V.:700]  Out: 1900 [Urine:1900]  Intake/Output this shift:  No intake/output data recorded.      Radiology  Imaging Results (Last 24 Hours)       ** No results found for the last 24 hours. **            Labs:  Results from last 7 days   Lab Units 01/08/24  0146   WBC 10*3/mm3 27.90*   HEMOGLOBIN g/dL 14.2   HEMATOCRIT % 43.9   PLATELETS 10*3/mm3 381     Results from last 7 days   Lab Units 01/05/24  0451 01/03/24  0106 01/02/24  1541   SODIUM mmol/L 141   < > 137   POTASSIUM mmol/L 4.2   < > 3.4*   CHLORIDE mmol/L 104   < > 96*   CO2 mmol/L 27.0   < > 33.2*   BUN mg/dL 11   < > 21   CREATININE mg/dL 0.68*   < > 0.97   CALCIUM mg/dL 8.7   < > 9.0   BILIRUBIN mg/dL  --   --  0.7   ALK PHOS U/L  --   --  273*   ALT (SGPT) U/L  --   --  56*   AST (SGOT) U/L  --   --  37   GLUCOSE mg/dL 104*   < > 118*    < > = values in this interval not displayed.         Results from last 7 days   Lab Units 01/02/24  1541   ALBUMIN g/dL 3.5     Results from last 7 days   Lab Units 01/02/24  1755 01/02/24  1541   HSTROP T ng/L 14 15  15             Results from last 7 days   Lab Units 01/03/24  0106   INR  1.14*               Meds:   SCHEDULE  amLODIPine, 10 mg, Oral,  Daily  atorvastatin, 10 mg, Oral, Daily  budesonide, 0.5 mg, Nebulization, BID - RT  cefTRIAXone (ROCEPHIN) 2,000 mg in sodium chloride 0.9 % 100 mL IVPB, 2,000 mg, Intravenous, Q24H  enoxaparin, 40 mg, Subcutaneous, Q24H  guaiFENesin, 1,200 mg, Oral, Q12H  metoprolol succinate XL, 50 mg, Oral, Daily  predniSONE, 20 mg, Oral, Daily With Breakfast  senna-docusate sodium, 2 tablet, Oral, BID  sodium chloride, 10 mL, Intravenous, Q12H      Infusions  lactated ringers, 75 mL/hr, Last Rate: 75 mL/hr (01/07/24 0533)      PRNs    senna-docusate sodium **AND** polyethylene glycol **AND** bisacodyl **AND** bisacodyl    Calcium Replacement - Follow Nurse / BPA Driven Protocol    hydrALAZINE    ipratropium-albuterol    Magnesium Standard Dose Replacement - Follow Nurse / BPA Driven Protocol    nitroglycerin    ondansetron ODT **OR** ondansetron    Phosphorus Replacement - Follow Nurse / BPA Driven Protocol    Potassium Replacement - Follow Nurse / BPA Driven Protocol    sodium chloride    sodium chloride    Physical Exam:  General Appearance:  Alert   HEENT:  Normocephalic, without obvious abnormality, Conjunctiva/corneas clear,.   Nares normal, no drainage     Neck:  Supple, symmetrical, trachea midline.   Lungs /Chest wall: Distant breath sounds with bilateral basal rhonchi, respirations unlabored, symmetrical wall movement.     Heart:  Regular rate and rhythm, S1 S2 normal  Abdomen: Soft, non-tender, no masses, no organomegaly.    Extremities: No edema, no clubbing or cyanosis     ROS  Constitutional: Negative for chills, fever and malaise/fatigue.   HENT: Negative.    Eyes: Negative.    Cardiovascular: Negative.    Respiratory: Positive for cough and shortness of breath.    Skin: Negative.    Musculoskeletal: Negative.    Gastrointestinal: Negative.    Genitourinary: Negative.    Neurological: Negative.    Psychiatric/Behavioral: Negative.      I reviewed the recent clinical results  I personally reviewed the latest  radiological studies    Part of this note may be an electronic transcription/translation of spoken language to printed text using the Dragon Dictation System.      Electronically signed by Sheldon Montiel MD at 24 0752       Erik Jacobo LPN at 24 0238          Goal Outcome Evaluation:              Outcome Evaluation: Pt stable with no complaints of pain. VSS, 3L nasal cannula, IV abx. Incentive spirometer at bedside. Call light within reach, bed in lowest position, able to make needs known. Plan of care ongoing.           Electronically signed by Erik Jacobo LPN at 24 0239       Lisandro Fuchs MD at 24 1010              [facility] MEDICINE SERVICE  DAILY PROGRESS NOTE    NAME: Lizz Powell  : 1945  MRN: [id number]      LOS: 5 days     PROVIDER OF SERVICE: Lisandro Fuchs MD    Chief Complaint: Bilateral pneumonia    Subjective:     Interval History:  History taken from: patient    Reported breathing is slightly better has been having some cough going on.    Review of Systems:   Review of Systems   Respiratory:  Positive for cough and shortness of breath.        Objective:     Vital Signs  Temp:  [97.5 °F (36.4 °C)-97.8 °F (36.6 °C)] 97.5 °F (36.4 °C)  Heart Rate:  [] 104  Resp:  [18-24] 20  BP: (150-187)/(65-83) 170/83  Flow (L/min):  [3] 3   Body mass index is 29.01 kg/m².    Physical Exam  Physical Exam  Vitals and nursing note reviewed.   Constitutional:       Appearance: He is well-developed.   HENT:      Head: Normocephalic.   Cardiovascular:      Rate and Rhythm: Normal rate and regular rhythm.      Heart sounds: Normal heart sounds. No murmur heard.     No friction rub. No gallop.   Pulmonary:      Breath sounds: No stridor.   Abdominal:      General: There is no distension.      Palpations: Abdomen is soft. There is no mass.      Tenderness: There is no abdominal tenderness. There is no guarding.      Hernia: No hernia is present.   Musculoskeletal:          General: No deformity.   Skin:     General: Skin is warm and dry.      Findings: No erythema.   Psychiatric:         Behavior: Behavior normal.         Scheduled Meds   [START ON 1/8/2024] amLODIPine, 10 mg, Oral, Daily  atorvastatin, 10 mg, Oral, Daily  budesonide, 0.5 mg, Nebulization, BID - RT  cefTRIAXone (ROCEPHIN) 2,000 mg in sodium chloride 0.9 % 100 mL IVPB, 2,000 mg, Intravenous, Q24H  enoxaparin, 40 mg, Subcutaneous, Q24H  guaiFENesin, 1,200 mg, Oral, Q12H  metoprolol succinate XL, 50 mg, Oral, Daily  [START ON 1/8/2024] predniSONE, 20 mg, Oral, Daily With Breakfast  senna-docusate sodium, 2 tablet, Oral, BID  sodium chloride, 10 mL, Intravenous, Q12H       PRN Meds     senna-docusate sodium **AND** polyethylene glycol **AND** bisacodyl **AND** bisacodyl    Calcium Replacement - Follow Nurse / BPA Driven Protocol    hydrALAZINE    ipratropium-albuterol    Magnesium Standard Dose Replacement - Follow Nurse / BPA Driven Protocol    nitroglycerin    ondansetron ODT **OR** ondansetron    Phosphorus Replacement - Follow Nurse / BPA Driven Protocol    Potassium Replacement - Follow Nurse / BPA Driven Protocol    sodium chloride    sodium chloride   Infusions  lactated ringers, 75 mL/hr, Last Rate: 75 mL/hr (01/07/24 0533)          Diagnostic Data    Results from last 7 days   Lab Units 01/07/24  0625 01/05/24  0511 01/05/24  0451 01/03/24  0106 01/02/24  1541   WBC 10*3/mm3 40.30*   < >  --    < > 29.50*   HEMOGLOBIN g/dL 14.4   < >  --    < > 15.4   HEMATOCRIT % 44.7   < >  --    < > 48.4   PLATELETS 10*3/mm3 424   < >  --    < > 413   GLUCOSE mg/dL  --   --  104*   < > 118*   CREATININE mg/dL  --   --  0.68*   < > 0.97   BUN mg/dL  --   --  11   < > 21   SODIUM mmol/L  --   --  141   < > 137   POTASSIUM mmol/L  --   --  4.2   < > 3.4*   AST (SGOT) U/L  --   --   --   --  37   ALT (SGPT) U/L  --   --   --   --  56*   ALK PHOS U/L  --   --   --   --  273*   BILIRUBIN mg/dL  --   --   --   --  0.7   ANION  GAP mmol/L  --   --  10.0   < > 7.8    < > = values in this interval not displayed.       XR Chest 1 View    Result Date: 1/6/2024  1.No evidence for acute cardiopulmonary process. Electronically Signed: Gigi Marvin MD  1/6/2024 9:43 AM EST  Workstation ID: [id number]       I reviewed the patient's new clinical results.    Assessment/Plan:     Active and Resolved Problems  Active Hospital Problems    Diagnosis  POA    **Bilateral pneumonia [J18.9]  Yes    Multiple tracheobronchial mucus plugs [T17.800A]  Unknown      Resolved Hospital Problems   No resolved problems to display.     #Multi-focal Pneumonia  #Respiratory insufficiency    - Patient 87% on Room air at JR, placed on 2L NC    - CXR shows bilateral airspace opacities L>R and small left pleural effusion with adenopathy.    - CT PE     - covid/flu negative    - strep, legionella, sputum    - blood cultures    - Rocephin 2g IV daily    - Azithromycin 500mg IV x 3 days    - pulm consulted, suspect will need bronch to rule out neoplasm    - NPO    - LR @ 75/hr    - Lactate 1.5, monitor    - HS troponin 15 > 14, doubtful ACS    - proBNP 179.3, doubt fluid overload  Pulmonology also managing, we will continue to monitor continue to follow cultures.  Continue with IV antibiotics  Bronchoscopy done today, we will continue to monitor.  Continue with IV antibiotic and continue to follow cultures    Chest x-ray repeated yesterday, no significant changes.     #COPD    - Duoneb QID    - Pulmicort BID    - hold off IV steroids at this time  Continue with nebulizer treatments  Continue to wean oxygen as tolerated.  Continue to wean oxygen as tolerated currently on 3 L.  He does not wear home oxygen     #Hypokalemia    - replace per protocol     #Tobacco use disorder    - cessation recommended      DVT prophylaxis:  Medical and mechanical DVT prophylaxis orders are present.     Code status is   Code Status and Medical Interventions:   Ordered at: 01/02/24 2120     Code  Status (Patient has no pulse and is not breathing):    CPR (Attempt to Resuscitate)     Medical Interventions (Patient has pulse or is breathing):    Full Support       Plan for disposition: Home in 1-2 days    Time: 30 minutes    Signature: Electronically signed by Lisandro Fuchs MD, 01/07/24, 10:[facility]     Electronically signed by Lisandro Fuchs MD at 01/07/24 1018       Sheldon Montiel MD at 01/07/24 0915          Daily Progress Note          Assessment    Bilateral pneumonia with tree-in-bud pattern and areas of consolidation  Multiple mucous plugs  Hypoxemia  COPD  Tobacco smoking  Leukocytosis     Recommendations:  Status post bronchoscopy 1/5/2024 with removal of multiple white-colored mucous plugs: Monitor results, so for normal sp    Leukocytosis is noted, clinically patient is improving, likely due to steroids  antibiotics: Rocephin  IV steroids discontinue, start prednisone 20 mg daily for 5 days    Patient will need follow-up CT scan of the chest in 4-6 weeks to ensure resolution of the infiltrates  Oxygen supplement and titration to maintain saturation 90 to 95%: Currently requiring 3 L per nasal cannula    Bronchodilators  Inhaled corticosteroids  Mucinex  Smoking cessation counseling  DVT prophylaxis      I personally reviewed the radiological studies               LOS: 5 days     Subjective    Patient reports cough and shortness of breath    Objective    Vital signs for last 24 hours:  Vitals:    01/07/24 0310 01/07/24 0500 01/07/24 0845 01/07/24 0851   BP: (!) 187/83 170/83     BP Location:       Patient Position:       Pulse: 102  105 104   Resp: 20  19 20   Temp: 97.5 °F (36.4 °C)      TempSrc:       SpO2: 95%  93% 94%   Weight:       Height:           Intake/Output last 3 shifts:  I/O last 3 completed shifts:  In: 2140 [P.O.:1440; I.V.:700]  Out: 1700 [Urine:1700]  Intake/Output this shift:  I/O this shift:  In: -   Out: 175  [Urine:175]      Radiology  Imaging Results (Last 24 Hours)       Procedure Component Value Units Date/Time    XR Chest 1 View [[id number]] Collected: 01/06/24 0943     Updated: 01/06/24 0945    Narrative:      XR CHEST 1 VW    Date of Exam: 1/6/2024 9:20 AM EST    Indication: shortness of air    Comparison: 1/2/2024    FINDINGS:  No consolidations or pleural effusions are observed. The cardiac silhouette is within normal limits for size. The mediastinum is unremarkable. No acute osseous abnormalities are identified on this single view.      Impression:      1.No evidence for acute cardiopulmonary process.      Electronically Signed: Gigi Marvin MD    1/6/2024 9:43 AM EST    Workstation ID: [id number]            Labs:  Results from last 7 days   Lab Units 01/07/24  0625   WBC 10*3/mm3 40.30*   HEMOGLOBIN g/dL 14.4   HEMATOCRIT % 44.7   PLATELETS 10*3/mm3 424     Results from last 7 days   Lab Units 01/05/24  0451 01/03/24  0106 01/02/24  1541   SODIUM mmol/L 141   < > 137   POTASSIUM mmol/L 4.2   < > 3.4*   CHLORIDE mmol/L 104   < > 96*   CO2 mmol/L 27.0   < > 33.2*   BUN mg/dL 11   < > 21   CREATININE mg/dL 0.68*   < > 0.97   CALCIUM mg/dL 8.7   < > 9.0   BILIRUBIN mg/dL  --   --  0.7   ALK PHOS U/L  --   --  273*   ALT (SGPT) U/L  --   --  56*   AST (SGOT) U/L  --   --  37   GLUCOSE mg/dL 104*   < > 118*    < > = values in this interval not displayed.         Results from last 7 days   Lab Units 01/02/24  1541   ALBUMIN g/dL 3.5     Results from last 7 days   Lab Units 01/02/24  1755 01/02/24  1541   HSTROP T ng/L 14 15  15             Results from last 7 days   Lab Units 01/03/24  0106   INR  1.14*               Meds:   SCHEDULE  amLODIPine, 5 mg, Oral, Daily  atorvastatin, 10 mg, Oral, Daily  budesonide, 0.5 mg, Nebulization, BID - RT  cefTRIAXone (ROCEPHIN) 2,000 mg in sodium chloride 0.9 % 100 mL IVPB, 2,000 mg, Intravenous, Q24H  guaiFENesin, 1,200 mg, Oral, Q12H  methylPREDNISolone sodium succinate, 40 mg,  Intravenous, Q12H  metoprolol succinate XL, 50 mg, Oral, Daily  senna-docusate sodium, 2 tablet, Oral, BID  sodium chloride, 10 mL, Intravenous, Q12H      Infusions  lactated ringers, 75 mL/hr, Last Rate: 75 mL/hr (01/07/24 0533)      PRNs    senna-docusate sodium **AND** polyethylene glycol **AND** bisacodyl **AND** bisacodyl    Calcium Replacement - Follow Nurse / BPA Driven Protocol    hydrALAZINE    ipratropium-albuterol    Magnesium Standard Dose Replacement - Follow Nurse / BPA Driven Protocol    nitroglycerin    ondansetron ODT **OR** ondansetron    Phosphorus Replacement - Follow Nurse / BPA Driven Protocol    Potassium Replacement - Follow Nurse / BPA Driven Protocol    sodium chloride    sodium chloride    Physical Exam:  General Appearance:  Alert   HEENT:  Normocephalic, without obvious abnormality, Conjunctiva/corneas clear,.   Nares normal, no drainage     Neck:  Supple, symmetrical, trachea midline.   Lungs /Chest wall: Distant breath sounds with bilateral basal rhonchi, respirations unlabored, symmetrical wall movement.     Heart:  Regular rate and rhythm, S1 S2 normal  Abdomen: Soft, non-tender, no masses, no organomegaly.    Extremities: No edema, no clubbing or cyanosis     ROS  Constitutional: Negative for chills, fever and malaise/fatigue.   HENT: Negative.    Eyes: Negative.    Cardiovascular: Negative.    Respiratory: Positive for cough and shortness of breath.    Skin: Negative.    Musculoskeletal: Negative.    Gastrointestinal: Negative.    Genitourinary: Negative.    Neurological: Negative.    Psychiatric/Behavioral: Negative.      I reviewed the recent clinical results  I personally reviewed the latest radiological studies    Part of this note may be an electronic transcription/translation of spoken language to printed text using the Dragon Dictation System.      Electronically signed by Sheldon Montiel MD at 01/07/24 0918       Sugar Catalan RN at 01/07/24 0414          Goal Outcome  Evaluation:      Patient alert oriented able to make needs known. Patient has had no complaints of pain this shift. Patient reports shortness of breath with activity. On oxygen 3L/NC.Infrequent tight cough. Patient blood pressure has been elevated this shift. SANDY Vargas notified. PRN order if blood pressure over 170. Bed in low position, call light in reach, room near nursing station.                     Electronically signed by Sugar Catalan RN at 24 0418       Lisandro Fuchs MD at 24 0984              [facility] MEDICINE SERVICE  DAILY PROGRESS NOTE    NAME: Lizz Powell  : 1945  MRN: [id number]      LOS: 4 days     PROVIDER OF SERVICE: Lisandro Fuchs MD    Chief Complaint: Bilateral pneumonia    Subjective:     Interval History:  History taken from: patient    Patient is having difficulty breathing and coughing.  Continue with supplemental oxygen    Review of Systems:   Review of Systems   Respiratory:  Positive for cough and shortness of breath.        Objective:     Vital Signs  Temp:  [97.2 °F (36.2 °C)-99.2 °F (37.3 °C)] 97.4 °F (36.3 °C)  Heart Rate:  [79-99] 99  Resp:  [18-38] 19  BP: (134-169)/(61-76) 134/67  Flow (L/min):  [4] 4   Body mass index is 29.01 kg/m².    Physical Exam  Physical Exam  Vitals and nursing note reviewed.   Constitutional:       General: He is not in acute distress.     Appearance: He is well-developed. He is not diaphoretic.   HENT:      Head: Normocephalic and atraumatic.   Cardiovascular:      Rate and Rhythm: Normal rate.   Pulmonary:      Effort: Pulmonary effort is normal. No respiratory distress.      Breath sounds: No wheezing.   Abdominal:      General: There is no distension.      Palpations: Abdomen is soft.   Musculoskeletal:         General: Normal range of motion.   Skin:     General: Skin is warm and dry.   Neurological:      Mental Status: He is alert.      Cranial Nerves: No cranial nerve deficit.   Psychiatric:         Behavior:  Behavior normal.         Thought Content: Thought content normal.         Judgment: Judgment normal.         Scheduled Meds   amLODIPine, 5 mg, Oral, Daily  atorvastatin, 10 mg, Oral, Daily  budesonide, 0.5 mg, Nebulization, BID - RT  cefTRIAXone (ROCEPHIN) 2,000 mg in sodium chloride 0.9 % 100 mL IVPB, 2,000 mg, Intravenous, Q24H  guaiFENesin, 1,200 mg, Oral, Q12H  ipratropium-albuterol, 3 mL, Nebulization, 4x Daily - RT  methylPREDNISolone sodium succinate, 40 mg, Intravenous, Q12H  metoprolol succinate XL, 50 mg, Oral, Daily  senna-docusate sodium, 2 tablet, Oral, BID  sodium chloride, 10 mL, Intravenous, Q12H       PRN Meds     senna-docusate sodium **AND** polyethylene glycol **AND** bisacodyl **AND** bisacodyl    Calcium Replacement - Follow Nurse / BPA Driven Protocol    Magnesium Standard Dose Replacement - Follow Nurse / BPA Driven Protocol    nitroglycerin    ondansetron ODT **OR** ondansetron    Phosphorus Replacement - Follow Nurse / BPA Driven Protocol    Potassium Replacement - Follow Nurse / BPA Driven Protocol    sodium chloride    sodium chloride   Infusions  lactated ringers, 75 mL/hr, Last Rate: 75 mL/hr (01/02/24 2242)          Diagnostic Data    Results from last 7 days   Lab Units 01/05/24  2329 01/05/24  0511 01/05/24  0451 01/03/24  0106 01/02/24  1541   WBC 10*3/mm3 26.80*   < >  --    < > 29.50*   HEMOGLOBIN g/dL 12.8*   < >  --    < > 15.4   HEMATOCRIT % 39.2   < >  --    < > 48.4   PLATELETS 10*3/mm3 349   < >  --    < > 413   GLUCOSE mg/dL  --   --  104*   < > 118*   CREATININE mg/dL  --   --  0.68*   < > 0.97   BUN mg/dL  --   --  11   < > 21   SODIUM mmol/L  --   --  141   < > 137   POTASSIUM mmol/L  --   --  4.2   < > 3.4*   AST (SGOT) U/L  --   --   --   --  37   ALT (SGPT) U/L  --   --   --   --  56*   ALK PHOS U/L  --   --   --   --  273*   BILIRUBIN mg/dL  --   --   --   --  0.7   ANION GAP mmol/L  --   --  10.0   < > 7.8    < > = values in this interval not displayed.       No  radiology results for the last day      I reviewed the patient's new clinical results.    Assessment/Plan:     Active and Resolved Problems  Active Hospital Problems    Diagnosis  POA    **Bilateral pneumonia [J18.9]  Yes    Multiple tracheobronchial mucus plugs [T17.800A]  Unknown      Resolved Hospital Problems   No resolved problems to display.     #Multi-focal Pneumonia  #Respiratory insufficiency    - Patient 87% on Room air at JR, placed on 2L NC    - CXR shows bilateral airspace opacities L>R and small left pleural effusion with adenopathy.    - CT PE     - covid/flu negative    - strep, legionella, sputum    - blood cultures    - Rocephin 2g IV daily    - Azithromycin 500mg IV x 3 days    - pulm consulted, suspect will need bronch to rule out neoplasm    - NPO    - LR @ 75/hr    - Lactate 1.5, monitor    - HS troponin 15 > 14, doubtful ACS    - proBNP 179.3, doubt fluid overload  Pulmonology also managing, we will continue to monitor continue to follow cultures.  Continue with IV antibiotics  Bronchoscopy done today, we will continue to monitor.  Continue with IV antibiotic and continue to follow cultures  Chest x-ray will be repeated today     #COPD    - Duoneb QID    - Pulmicort BID    - hold off IV steroids at this time  Continue with nebulizer treatments  Continue to wean oxygen as tolerated.     #Hypokalemia    - replace per protocol     #Tobacco use disorder    - cessation recommended      DVT prophylaxis:  Mechanical DVT prophylaxis orders are present.     Code status is   Code Status and Medical Interventions:   Ordered at: 01/02/24 2120     Code Status (Patient has no pulse and is not breathing):    CPR (Attempt to Resuscitate)     Medical Interventions (Patient has pulse or is breathing):    Full Support       Plan for disposition: Home in 1-2 days    Time: 30 minutes    Signature: Electronically signed by Lisandro Fuchs MD, 01/06/24, 09:21 EST.  [facility] Hospitalist Team     Electronically  signed by Lisandro Fuchs MD at 01/06/24 0922       Sheldon Montiel MD at 01/06/24 0851          Daily Progress Note          Assessment    Bilateral pneumonia with tree-in-bud pattern and areas of consolidation  Multiple mucous plugs  Hypoxemia  COPD  Tobacco smoking  Leukocytosis     Recommendations:  Status post bronchoscopy 1/5/2024 with removal of multiple white-colored mucous plugs: Monitor results  antibiotics: Rocephin  IV steroids    Patient will need follow-up CT scan of the chest in 4-6 weeks to ensure resolution of the infiltrates  Oxygen supplement and titration to maintain saturation 90 to 95%: Currently requiring 4 L per nasal cannula    Bronchodilators  Inhaled corticosteroids  Mucinex  Smoking cessation counseling  DVT prophylaxis      I personally reviewed the radiological studies             LOS: 4 days     Subjective    Patient reports cough and shortness of breath    Objective    Vital signs for last 24 hours:  Vitals:    01/06/24 0815 01/06/24 0817 01/06/24 0820 01/06/24 0842   BP:    134/67   BP Location:    Right arm   Patient Position:    Sitting   Pulse: 90 84 84 99   Resp: 20 20 20 19   Temp:    97.4 °F (36.3 °C)   TempSrc:    Oral   SpO2: 99% 99% 100% 96%   Weight:       Height:           Intake/Output last 3 shifts:  I/O last 3 completed shifts:  In: 1240 [P.O.:840; I.V.:200; IV Piggyback:200]  Out: 2200 [Urine:2200]  Intake/Output this shift:  I/O this shift:  In: -   Out: 175 [Urine:175]      Radiology  Imaging Results (Last 24 Hours)       ** No results found for the last 24 hours. **            Labs:  Results from last 7 days   Lab Units 01/05/24  2329   WBC 10*3/mm3 26.80*   HEMOGLOBIN g/dL 12.8*   HEMATOCRIT % 39.2   PLATELETS 10*3/mm3 349     Results from last 7 days   Lab Units 01/05/24  0451 01/03/24  0106 01/02/24  1541   SODIUM mmol/L 141   < > 137   POTASSIUM mmol/L 4.2   < > 3.4*   CHLORIDE mmol/L 104   < > 96*   CO2 mmol/L 27.0   < > 33.2*   BUN mg/dL 11   < > 21    CREATININE mg/dL 0.68*   < > 0.97   CALCIUM mg/dL 8.7   < > 9.0   BILIRUBIN mg/dL  --   --  0.7   ALK PHOS U/L  --   --  273*   ALT (SGPT) U/L  --   --  56*   AST (SGOT) U/L  --   --  37   GLUCOSE mg/dL 104*   < > 118*    < > = values in this interval not displayed.         Results from last 7 days   Lab Units 01/02/24  1541   ALBUMIN g/dL 3.5     Results from last 7 days   Lab Units 01/02/24  1755 01/02/24  1541   HSTROP T ng/L 14 15  15             Results from last 7 days   Lab Units 01/03/24  0106   INR  1.14*               Meds:   SCHEDULE  amLODIPine, 5 mg, Oral, Daily  atorvastatin, 10 mg, Oral, Daily  budesonide, 0.5 mg, Nebulization, BID - RT  cefTRIAXone (ROCEPHIN) 2,000 mg in sodium chloride 0.9 % 100 mL IVPB, 2,000 mg, Intravenous, Q24H  guaiFENesin, 1,200 mg, Oral, Q12H  ipratropium-albuterol, 3 mL, Nebulization, 4x Daily - RT  methylPREDNISolone sodium succinate, 40 mg, Intravenous, Q12H  metoprolol succinate XL, 50 mg, Oral, Daily  senna-docusate sodium, 2 tablet, Oral, BID  sodium chloride, 10 mL, Intravenous, Q12H      Infusions  lactated ringers, 75 mL/hr, Last Rate: 75 mL/hr (01/02/24 2242)      PRNs    senna-docusate sodium **AND** polyethylene glycol **AND** bisacodyl **AND** bisacodyl    Calcium Replacement - Follow Nurse / BPA Driven Protocol    Magnesium Standard Dose Replacement - Follow Nurse / BPA Driven Protocol    nitroglycerin    ondansetron ODT **OR** ondansetron    Phosphorus Replacement - Follow Nurse / BPA Driven Protocol    Potassium Replacement - Follow Nurse / BPA Driven Protocol    sodium chloride    sodium chloride    Physical Exam:  General Appearance:  Alert   HEENT:  Normocephalic, without obvious abnormality, Conjunctiva/corneas clear,.   Nares normal, no drainage     Neck:  Supple, symmetrical, trachea midline.   Lungs /Chest wall: Distant breath sounds with bilateral basal rhonchi, respirations unlabored, symmetrical wall movement.     Heart:  Regular rate and rhythm,  S1 S2 normal  Abdomen: Soft, non-tender, no masses, no organomegaly.    Extremities: No edema, no clubbing or cyanosis     ROS  Constitutional: Negative for chills, fever and malaise/fatigue.   HENT: Negative.    Eyes: Negative.    Cardiovascular: Negative.    Respiratory: Positive for cough and shortness of breath.    Skin: Negative.    Musculoskeletal: Negative.    Gastrointestinal: Negative.    Genitourinary: Negative.    Neurological: Negative.    Psychiatric/Behavioral: Negative.      I reviewed the recent clinical results  I personally reviewed the latest radiological studies    Part of this note may be an electronic transcription/translation of spoken language to printed text using the Dragon Dictation System.      Electronically signed by Sheldon Montiel MD at 01/06/24 0854       Sugar Catalan RN at 01/06/24 0146          Goal Outcome Evaluation:      Patient alert oriented but forgetful at times. Pain controlled with PO pain medication. Patient screened positive for Sepsis this shift MD notified no new orders. Patient cardiac monitored/ Sandra. Healthy heart diet. Assist x 1 to bathroom. BM this shift. Patient on oxygen 4L/NC. Lungs diminished. Infrequent cough. Patient report able to breath better this shift. Voids with no difficulty. Bed in low position, call in reach, room organized, room near nursing station.                     Electronically signed by Sugar Catalan RN at 01/06/24 0151       Malu Shafer LPN at 01/05/24 1712            Problem: Adult Inpatient Plan of Care  Goal: Plan of Care Review  Outcome: Ongoing, Progressing  Flowsheets (Taken 1/5/2024 1711)  Progress: improving  Plan of Care Reviewed With:   patient   daughter  Outcome Evaluation: patient is stable. no complaints of pain. coughs often.  Continued with IV abx. Safetymeasures in place. Call light within reach. Patient able to make needs known. Plan of care ongoing.  Goal: Patient-Specific Goal (Individualized)  Outcome:  Ongoing, Progressing  Goal: Absence of Hospital-Acquired Illness or Injury  Outcome: Ongoing, Progressing  Intervention: Identify and Manage Fall Risk  Recent Flowsheet Documentation  Taken 1/5/2024 1425 by Malu Shafer LPN  Safety Promotion/Fall Prevention:   activity supervised   assistive device/personal items within reach   clutter free environment maintained   nonskid shoes/slippers when out of bed   room organization consistent   safety round/check completed  Taken 1/5/2024 1228 by Malu Shafer LPN  Safety Promotion/Fall Prevention:   activity supervised   assistive device/personal items within reach   clutter free environment maintained   nonskid shoes/slippers when out of bed   room organization consistent   safety round/check completed  Taken 1/5/2024 1022 by Malu Shafer LPN  Safety Promotion/Fall Prevention:   activity supervised   assistive device/personal items within reach   clutter free environment maintained   room organization consistent   safety round/check completed  Taken 1/5/2024 0839 by Malu Shafer LPN  Safety Promotion/Fall Prevention: patient off unit  Intervention: Prevent Skin Injury  Recent Flowsheet Documentation  Taken 1/5/2024 0921 by Malu Shafer LPN  Body Position: position changed independently  Intervention: Prevent and Manage VTE (Venous Thromboembolism) Risk  Recent Flowsheet Documentation  Taken 1/5/2024 1228 by Malu Shafer LPN  Activity Management: activity encouraged  Taken 1/5/2024 0921 by Malu Shafer LPN  Activity Management: activity encouraged  VTE Prevention/Management:   bilateral   sequential compression devices on  Intervention: Prevent Infection  Recent Flowsheet Documentation  Taken 1/5/2024 1228 by Malu Shafer LPN  Infection Prevention:   hand hygiene promoted   personal protective equipment utilized   single patient room provided  Goal: Optimal Comfort and Wellbeing  Outcome: Ongoing, Progressing  Intervention: Provide Person-Centered  Care  Recent Flowsheet Documentation  Taken 1/5/2024 0921 by Malu Shafer LPN  Trust Relationship/Rapport:   care explained   questions answered   questions encouraged   thoughts/feelings acknowledged   reassurance provided  Goal: Readiness for Transition of Care  Outcome: Ongoing, Progressing     Problem: COPD (Chronic Obstructive Pulmonary Disease) Comorbidity  Goal: Maintenance of COPD Symptom Control  Outcome: Ongoing, Progressing  Intervention: Maintain COPD-Symptom Control  Recent Flowsheet Documentation  Taken 1/5/2024 1425 by Malu Shafer LPN  Medication Review/Management: medications reviewed  Taken 1/5/2024 1228 by Malu Shafer LPN  Medication Review/Management: medications reviewed  Taken 1/5/2024 1022 by Malu Shafer LPN  Medication Review/Management: medications reviewed     Problem: Hypertension Comorbidity  Goal: Blood Pressure in Desired Range  Outcome: Ongoing, Progressing  Intervention: Maintain Blood Pressure Management  Recent Flowsheet Documentation  Taken 1/5/2024 1425 by Malu Shafer LPN  Medication Review/Management: medications reviewed  Taken 1/5/2024 1228 by Malu Shafer LPN  Medication Review/Management: medications reviewed  Taken 1/5/2024 1022 by Malu Shafer LPN  Medication Review/Management: medications reviewed     Problem: Fluid Imbalance (Pneumonia)  Goal: Fluid Balance  Outcome: Ongoing, Progressing     Problem: Infection (Pneumonia)  Goal: Resolution of Infection Signs and Symptoms  Outcome: Ongoing, Progressing     Problem: Respiratory Compromise (Pneumonia)  Goal: Effective Oxygenation and Ventilation  Outcome: Ongoing, Progressing  Intervention: Promote Airway Secretion Clearance  Recent Flowsheet Documentation  Taken 1/5/2024 0921 by Malu Shafer LPN  Cough And Deep Breathing: done independently per patient     Problem: Fall Injury Risk  Goal: Absence of Fall and Fall-Related Injury  Outcome: Ongoing, Progressing  Intervention: Identify and Manage  Contributors  Recent Flowsheet Documentation  Taken 1/5/2024 1425 by Malu Shafer LPN  Medication Review/Management: medications reviewed  Taken 1/5/2024 1228 by Malu Shafer LPN  Medication Review/Management: medications reviewed  Taken 1/5/2024 1022 by Malu Shafer LPN  Medication Review/Management: medications reviewed  Intervention: Promote Injury-Free Environment  Recent Flowsheet Documentation  Taken 1/5/2024 1425 by Malu Shafer LPN  Safety Promotion/Fall Prevention:   activity supervised   assistive device/personal items within reach   clutter free environment maintained   nonskid shoes/slippers when out of bed   room organization consistent   safety round/check completed  Taken 1/5/2024 1228 by Malu Shafer LPN  Safety Promotion/Fall Prevention:   activity supervised   assistive device/personal items within reach   clutter free environment maintained   nonskid shoes/slippers when out of bed   room organization consistent   safety round/check completed  Taken 1/5/2024 1022 by Malu Shafer LPN  Safety Promotion/Fall Prevention:   activity supervised   assistive device/personal items within reach   clutter free environment maintained   room organization consistent   safety round/check completed  Taken 1/5/2024 0839 by Malu Shafer LPN  Safety Promotion/Fall Prevention: patient off unit   Goal Outcome Evaluation:  Plan of Care Reviewed With: patient, daughter        Progress: improving  Outcome Evaluation: patient is stable. no complaints of pain. coughs often.  Continued with IV abx. Safetymeasures in place. Call light within reach. Patient able to make needs known. Plan of care ongoing.           Electronically signed by Malu Shafer LPN at 01/05/24 1712       Frank Santos RN at 01/05/24 1447          Continued Stay Note  [facility]     Patient Name: Lizz Powell  MRN: [id number]  Today's Date: 1/5/2024    Admit Date: 1/2/2024    Plan: Plan to return home in [place], can stay at  daughter's home locally if need. Watch for O2 needs.   Discharge Plan       Row Name 24 1445       Plan    Plan Plan to return home in [place], can stay at daughter's home locally if need. Watch for O2 needs.    Plan Comments Plan to return home in [place], can stay at daughter's home locally if need.  Watch for O2 needs. DC Barriers:  IV Abxs, 3L O2, WBC 19.                 Expected Discharge Date and Time       Expected Discharge Date Expected Discharge Time    2024               SANCHO Santos RN  SIPS/ICU   O: [phone]  C: [phone]  [email or web address]      Electronically signed by Frank Santos RN at 24       Lisandro Fuchs MD at 24 144              [facility] MEDICINE SERVICE  DAILY PROGRESS NOTE    NAME: Lizz Powell  : 1945  MRN: [id number]      LOS: 3 days     PROVIDER OF SERVICE: Lisandro Fuchs MD    Chief Complaint: Bilateral pneumonia    Subjective:     Interval History:  History taken from: patient      Patient doing better bronchoscopy today as well.    Review of Systems:   Review of Systems   Respiratory:  Positive for shortness of breath.        Objective:     Vital Signs  Temp:  [98 °F (36.7 °C)-99.2 °F (37.3 °C)] 99.2 °F (37.3 °C)  Heart Rate:  [78-96] 95  Resp:  [14-22] 19  BP: (135-174)/(60-76) 148/66  Flow (L/min):  [1-4] 4   Body mass index is 29.01 kg/m².    Physical Exam  Physical Exam  Vitals and nursing note reviewed.   Constitutional:       Appearance: Normal appearance. He is well-developed.   HENT:      Head: Normocephalic.   Cardiovascular:      Rate and Rhythm: Normal rate and regular rhythm.      Heart sounds: Normal heart sounds. No murmur heard.     No friction rub. No gallop.   Pulmonary:      Breath sounds: No stridor.   Abdominal:      General: There is no distension.      Palpations: Abdomen is soft. There is no mass.      Tenderness: There is no abdominal tenderness. There is no guarding.      Hernia: No  hernia is present.   Musculoskeletal:         General: No deformity.   Skin:     General: Skin is warm and dry.      Findings: No erythema.   Psychiatric:         Behavior: Behavior normal.         Scheduled Meds   amLODIPine, 5 mg, Oral, Daily  atorvastatin, 10 mg, Oral, Daily  budesonide, 0.5 mg, Nebulization, BID - RT  cefTRIAXone (ROCEPHIN) 2,000 mg in sodium chloride 0.9 % 100 mL IVPB, 2,000 mg, Intravenous, Q24H  guaiFENesin, 1,200 mg, Oral, Q12H  ipratropium-albuterol, 3 mL, Nebulization, 4x Daily - RT  methylPREDNISolone sodium succinate, 40 mg, Intravenous, Q12H  metoprolol succinate XL, 50 mg, Oral, Daily  senna-docusate sodium, 2 tablet, Oral, BID  sodium chloride, 10 mL, Intravenous, Q12H       PRN Meds     senna-docusate sodium **AND** polyethylene glycol **AND** bisacodyl **AND** bisacodyl    Calcium Replacement - Follow Nurse / BPA Driven Protocol    Magnesium Standard Dose Replacement - Follow Nurse / BPA Driven Protocol    nitroglycerin    ondansetron ODT **OR** ondansetron    Phosphorus Replacement - Follow Nurse / BPA Driven Protocol    Potassium Replacement - Follow Nurse / BPA Driven Protocol    sodium chloride    sodium chloride   Infusions  lactated ringers, 75 mL/hr, Last Rate: 75 mL/hr (01/02/24 2242)          Diagnostic Data    Results from last 7 days   Lab Units 01/05/24  0511 01/05/24  0451 01/03/24  0106 01/02/24  1541   WBC 10*3/mm3 19.00*  --    < > 29.50*   HEMOGLOBIN g/dL 13.2  --    < > 15.4   HEMATOCRIT % 40.4  --    < > 48.4   PLATELETS 10*3/mm3 381  --    < > 413   GLUCOSE mg/dL  --  104*   < > 118*   CREATININE mg/dL  --  0.68*   < > 0.97   BUN mg/dL  --  11   < > 21   SODIUM mmol/L  --  141   < > 137   POTASSIUM mmol/L  --  4.2   < > 3.4*   AST (SGOT) U/L  --   --   --  37   ALT (SGPT) U/L  --   --   --  56*   ALK PHOS U/L  --   --   --  273*   BILIRUBIN mg/dL  --   --   --  0.7   ANION GAP mmol/L  --  10.0   < > 7.8    < > = values in this interval not displayed.       No  radiology results for the last day      I reviewed the patient's new clinical results.    Assessment/Plan:     Active and Resolved Problems  Active Hospital Problems    Diagnosis  POA    **Bilateral pneumonia [J18.9]  Yes    Multiple tracheobronchial mucus plugs [T17.800A]  Unknown      Resolved Hospital Problems   No resolved problems to display.     #Multi-focal Pneumonia  #Respiratory insufficiency    - Patient 87% on Room air at JR, placed on 2L NC    - CXR shows bilateral airspace opacities L>R and small left pleural effusion with adenopathy.    - CT PE     - covid/flu negative    - strep, legionella, sputum    - blood cultures    - Rocephin 2g IV daily    - Azithromycin 500mg IV x 3 days    - pulm consulted, suspect will need bronch to rule out neoplasm    - NPO    - LR @ 75/hr    - Lactate 1.5, monitor    - HS troponin 15 > 14, doubtful ACS    - proBNP 179.3, doubt fluid overload  Pulmonology also managing, we will continue to monitor continue to follow cultures.  Continue with IV antibiotics  Bronchoscopy done today, we will continue to monitor.     #COPD    - Duoneb QID    - Pulmicort BID    - hold off IV steroids at this time  Continue with nebulizer treatments  Continue to wean oxygen as tolerated.     #Hypokalemia    - replace per protocol     #Tobacco use disorder    - cessation recommended      DVT prophylaxis:  Mechanical DVT prophylaxis orders are present.     Code status is   Code Status and Medical Interventions:   Ordered at: 01/02/24 2120     Code Status (Patient has no pulse and is not breathing):    CPR (Attempt to Resuscitate)     Medical Interventions (Patient has pulse or is breathing):    Full Support       Plan for disposition: Home in 1-2 days    Time: 30 minutes    Signature: Electronically signed by Lisandro Fuchs MD, 01/05/24, 14:42 EST.  [facility] Hospitalist Team     Electronically signed by Lisandro Fuchs MD at 01/05/24 1835       Debra Taylor at 01/05/24 1929         "  Social Work Assessment  [facility]     Patient Name: Lizz Powell  MRN: [id number]  Today's Date: 1/5/2024    Admit Date: 1/2/2024     Demographic Summary       Row Name 01/05/24 0928       General Information    Reason for Consult insurance concerns      General Information Comments SW received a SC stating pt is inquiring of insurance concerns related to current stay. SW spoke with pt's dtr Jenny who actually was inquiring of how many \"long term care days\" pt has remaining. SW consulted with RNCM and informed them they would need to call their insurance re: this, as pt is not rec'd for SNF, rather, he will return home at d/c. Jenny thanked this writer and agreed to reach out to insurance directly.           JOSE Serrano, W  Medical Social Worker  Ph [phone]  Fax [phone]  [email or web address]        Electronically signed by Debra Taylor at 01/05/24 0931       Sheldon Montiel MD at 01/05/24 0814          Bronchoscopy Procedure Note    Procedure:  Bronchoscopy, Diagnostic  Bronchoscopy, Therapeutic lavage of multiple mucous plugs  Bronchoalveolar lavage, BAL from right lower lobe    Pre-Operative Diagnosis: Multifocal pneumonia, multiple mucous plugs    Post-Operative Diagnosis: Same    Indication: Multifocal pneumonia with multiple mucous plugs and patient unable to clear his secretions    Anesthesia: Monitored Anesthesia Care (MAC)    Procedure Details: Patient was consented for the procedure with all risk and benefit of the procedure explained in detail.  Patient was given the opportunity to ask questions and all concerns were answered.    Timeout was done in the standard manner   the bronchoscope was inserted into the main airway via the oropharynx. An anatomical survey was done of the main airways and the subsegmental bronchus of the 5 lobes.  The findings are consistent of normal-appearing and functioning vocal cords, the mucosa of the bronchial tree was edematous and erythematous, large amount " of white mucous plugs were seen in the 5 lobes.  A therapeutic lavage was performed using aliquots of normal saline instilled into the airways then aspirated back until clear.  Diagnostic BAL was performed right lower lobe by instilling 90 mL of sterile normal saline and return of 45 mL.    Estimated Blood Loss: None           Specimens: BAL from right lower lobe                Complications:  None; patient tolerated the procedure well.           Disposition: PACU - hemodynamically stable.    Post op plan:  Resume p.o. after 2 hours  Follow-up results    Patient tolerated the procedure well.    Electronically signed by Sheldon Montiel MD at 01/05/24 0821       Procedures signed by Sheldon Montiel MD at 01/05/24 0912         Procedure Orders    1. BRONCHOSCOPY [[id number]] ordered by Sheldon Montiel MD at 01/05/24 0741               [Media Unavailable] Scan on 1/5/2024 0911 by Sheldon Montiel MD: BRONCH          Electronically signed by Sheldon Montiel MD at 01/05/24 0912       Sheldon Montiel MD at 01/05/24 0700          Daily Progress Note          Assessment    Bilateral pneumonia with tree-in-bud pattern and areas of consolidation  Multiple mucous plugs  Hypoxemia  COPD  Tobacco smoking  Leukocytosis     Recommendations:  Schedule bronchoscopy today to remove mucous plugs and obtain cultures   antibiotics, leukocytosis is slightly improving  Patient will need follow-up CT scan of the chest in 4-6 weeks to ensure resolution of the infiltrates  Oxygen supplement and titration to maintain saturation 90 to 95%: Currently requiring 2 L per nasal cannula  Currently on 2 L per nasal cannula  Bronchodilators  Inhaled corticosteroids  Smoking cessation counseling  DVT/GI prophylaxis  Check daily labs and correct electrolytes as needed  I personally reviewed the radiological studies             LOS: 3 days     Subjective    Patient reports cough and shortness of breath    Objective    Vital signs for last 24 hours:  Vitals:     01/05/24 0500 01/05/24 0740 01/05/24 0749 01/05/24 0753   BP: 139/68 174/76 157/65 152/67   BP Location: Right arm Right arm     Patient Position: Lying Lying     Pulse: 96 85 82 84   Resp: 18 22 20 20   Temp: 98.2 °F (36.8 °C)      TempSrc: Oral      SpO2: 92% 94% 96% 96%   Weight:       Height:           Intake/Output last 3 shifts:  I/O last 3 completed shifts:  In: 3565.2 [P.O.:1380; I.V.:1985.2; IV Piggyback:200]  Out: 1775 [Urine:1775]  Intake/Output this shift:  No intake/output data recorded.      Radiology  Imaging Results (Last 24 Hours)       ** No results found for the last 24 hours. **            Labs:  Results from last 7 days   Lab Units 01/05/24  0511   WBC 10*3/mm3 19.00*   HEMOGLOBIN g/dL 13.2   HEMATOCRIT % 40.4   PLATELETS 10*3/mm3 381     Results from last 7 days   Lab Units 01/05/24  0451 01/03/24  0106 01/02/24  1541   SODIUM mmol/L 141   < > 137   POTASSIUM mmol/L 4.2   < > 3.4*   CHLORIDE mmol/L 104   < > 96*   CO2 mmol/L 27.0   < > 33.2*   BUN mg/dL 11   < > 21   CREATININE mg/dL 0.68*   < > 0.97   CALCIUM mg/dL 8.7   < > 9.0   BILIRUBIN mg/dL  --   --  0.7   ALK PHOS U/L  --   --  273*   ALT (SGPT) U/L  --   --  56*   AST (SGOT) U/L  --   --  37   GLUCOSE mg/dL 104*   < > 118*    < > = values in this interval not displayed.         Results from last 7 days   Lab Units 01/02/24  1541   ALBUMIN g/dL 3.5     Results from last 7 days   Lab Units 01/02/24  1755 01/02/24  1541   HSTROP T ng/L 14 15  15             Results from last 7 days   Lab Units 01/03/24  0106   INR  1.14*               Meds:   SCHEDULE  amLODIPine, 5 mg, Oral, Daily  atorvastatin, 10 mg, Oral, Daily  budesonide, 0.5 mg, Nebulization, BID - RT  cefTRIAXone (ROCEPHIN) 2,000 mg in sodium chloride 0.9 % 100 mL IVPB, 2,000 mg, Intravenous, Q24H  ipratropium-albuterol, 3 mL, Nebulization, 4x Daily - RT  metoprolol succinate XL, 50 mg, Oral, Daily  senna-docusate sodium, 2 tablet, Oral, BID  sodium chloride, 10 mL,  Intravenous, Q12H      Infusions  lactated ringers, 75 mL/hr, Last Rate: 75 mL/hr (01/02/24 7928)      PRNs    senna-docusate sodium **AND** polyethylene glycol **AND** bisacodyl **AND** bisacodyl    Calcium Replacement - Follow Nurse / BPA Driven Protocol    lidocaine    Lidocaine HCl gel    Magnesium Standard Dose Replacement - Follow Nurse / BPA Driven Protocol    nitroglycerin    ondansetron ODT **OR** ondansetron    Phosphorus Replacement - Follow Nurse / BPA Driven Protocol    Potassium Replacement - Follow Nurse / BPA Driven Protocol    sodium chloride    sodium chloride    Physical Exam:  General Appearance:  Alert   HEENT:  Normocephalic, without obvious abnormality, Conjunctiva/corneas clear,.   Nares normal, no drainage     Neck:  Supple, symmetrical, trachea midline.   Lungs /Chest wall: Distant breath sounds with bilateral basal rhonchi, respirations unlabored, symmetrical wall movement.     Heart:  Regular rate and rhythm, S1 S2 normal  Abdomen: Soft, non-tender, no masses, no organomegaly.    Extremities: No edema, no clubbing or cyanosis     ROS  Constitutional: Negative for chills, fever and malaise/fatigue.   HENT: Negative.    Eyes: Negative.    Cardiovascular: Negative.    Respiratory: Positive for cough and shortness of breath.    Skin: Negative.    Musculoskeletal: Negative.    Gastrointestinal: Negative.    Genitourinary: Negative.    Neurological: Negative.    Psychiatric/Behavioral: Negative.      I reviewed the recent clinical results  I personally reviewed the latest radiological studies    Part of this note may be an electronic transcription/translation of spoken language to printed text using the Dragon Dictation System.      Electronically signed by Sheldon Montiel MD at 01/05/24 0823       Navya Valdes RN at 01/05/24 0157          Goal Outcome Evaluation:      Pt NPO. Consent signed. IVF infusing. Call light in reach.                     Electronically signed by Keisha  Navya DANIEL RN at 01/05/24 0158       Chanel Jolley RN at 01/04/24 1755          Goal Outcome Evaluation:  Plan of Care Reviewed With: patient        Progress: improving  Outcome Evaluation: Pt continues IVFs, IV antibiotics and oxygen at 2L via N/C. Pt up ad kristopher but gets short of breath if walking too far. VSS, no concerns at this time. Plan to have bronchoscopy tomorrow.           Electronically signed by Chanel Jolley RN at 01/04/24 9527       Sheldon Montiel MD at 01/04/24 1140          Daily Progress Note          Assessment    Bilateral pneumonia with tree-in-bud pattern and areas of consolidation  Multiple mucous plugs  Hypoxemia  COPD  Tobacco smoking     Recommendations:  Schedule bronchoscopy tomorrow to remove mucous plugs and obtain cultures   antibiotics,  Patient will need follow-up CT scan of the chest in 4-6 weeks to ensure resolution of the infiltrates  Oxygen supplement and titration to maintain saturation 90 to 95%: Currently requiring 2 L per nasal cannula  Currently on 2 L per nasal cannula  Bronchodilators  Inhaled corticosteroids  Smoking cessation counseling  DVT/GI prophylaxis  Check daily labs and correct electrolytes as needed  I personally reviewed the radiological studies             LOS: 2 days     Subjective    Patient reports cough and shortness of breath    Objective    Vital signs for last 24 hours:  Vitals:    01/04/24 0749 01/04/24 0839 01/04/24 1119 01/04/24 1122   BP:  135/78     BP Location:       Patient Position:       Pulse: 74 71 74 74   Resp: 18  18 17   Temp:       TempSrc:       SpO2: 95%  94% 94%   Weight:       Height:           Intake/Output last 3 shifts:  I/O last 3 completed shifts:  In: 3646.3 [P.O.:1080; I.V.:2566.3]  Out: 1000 [Urine:1000]  Intake/Output this shift:  I/O this shift:  In: 240 [P.O.:240]  Out: 225 [Urine:225]      Radiology  Imaging Results (Last 24 Hours)       ** No results found for the last 24 hours. **            Labs:  Results from last 7  days   Lab Units 01/04/24  0048   WBC 10*3/mm3 20.40*   HEMOGLOBIN g/dL 12.6*   HEMATOCRIT % 38.7   PLATELETS 10*3/mm3 371     Results from last 7 days   Lab Units 01/04/24  0048 01/03/24  0106 01/02/24  1541   SODIUM mmol/L 140   < > 137   POTASSIUM mmol/L 3.8   < > 3.4*   CHLORIDE mmol/L 102   < > 96*   CO2 mmol/L 28.0   < > 33.2*   BUN mg/dL 19   < > 21   CREATININE mg/dL 0.73*   < > 0.97   CALCIUM mg/dL 8.6   < > 9.0   BILIRUBIN mg/dL  --   --  0.7   ALK PHOS U/L  --   --  273*   ALT (SGPT) U/L  --   --  56*   AST (SGOT) U/L  --   --  37   GLUCOSE mg/dL 102*   < > 118*    < > = values in this interval not displayed.         Results from last 7 days   Lab Units 01/02/24  1541   ALBUMIN g/dL 3.5     Results from last 7 days   Lab Units 01/02/24  1755 01/02/24  1541   HSTROP T ng/L 14 15  15             Results from last 7 days   Lab Units 01/03/24  0106   INR  1.14*               Meds:   SCHEDULE  amLODIPine, 5 mg, Oral, Daily  atorvastatin, 10 mg, Oral, Daily  azithromycin, 500 mg, Intravenous, Q24H  budesonide, 0.5 mg, Nebulization, BID - RT  cefTRIAXone (ROCEPHIN) 2,000 mg in sodium chloride 0.9 % 100 mL IVPB, 2,000 mg, Intravenous, Q24H  ipratropium-albuterol, 3 mL, Nebulization, 4x Daily - RT  metoprolol succinate XL, 50 mg, Oral, Daily  senna-docusate sodium, 2 tablet, Oral, BID  sodium chloride, 10 mL, Intravenous, Q12H      Infusions  lactated ringers, 75 mL/hr, Last Rate: 75 mL/hr (01/02/24 2242)      PRNs    senna-docusate sodium **AND** polyethylene glycol **AND** bisacodyl **AND** bisacodyl    Calcium Replacement - Follow Nurse / BPA Driven Protocol    Magnesium Standard Dose Replacement - Follow Nurse / BPA Driven Protocol    nitroglycerin    ondansetron ODT **OR** ondansetron    Phosphorus Replacement - Follow Nurse / BPA Driven Protocol    Potassium Replacement - Follow Nurse / BPA Driven Protocol    sodium chloride    sodium chloride    Physical Exam:  General Appearance:  Alert   HEENT:   Normocephalic, without obvious abnormality, Conjunctiva/corneas clear,.   Nares normal, no drainage     Neck:  Supple, symmetrical, trachea midline.   Lungs /Chest wall: Distant breath sounds with bilateral basal rhonchi, respirations unlabored, symmetrical wall movement.     Heart:  Regular rate and rhythm, S1 S2 normal  Abdomen: Soft, non-tender, no masses, no organomegaly.    Extremities: No edema, no clubbing or cyanosis     ROS  Constitutional: Negative for chills, fever and malaise/fatigue.   HENT: Negative.    Eyes: Negative.    Cardiovascular: Negative.    Respiratory: Positive for cough and shortness of breath.    Skin: Negative.    Musculoskeletal: Negative.    Gastrointestinal: Negative.    Genitourinary: Negative.    Neurological: Negative.    Psychiatric/Behavioral: Negative.      I reviewed the recent clinical results  I personally reviewed the latest radiological studies    Part of this note may be an electronic transcription/translation of spoken language to printed text using the Dragon Dictation System.      Electronically signed by Sheldon Montiel MD at 01/04/24 1300       Frank Santos RN at 01/04/24 1053          Continued Stay Note  [facility]     Patient Name: Lizz Powell  MRN: [id number]  Today's Date: 1/4/2024    Admit Date: 1/2/2024    Plan: Plan to return home in [place], can stay at daughter's home locally if need.   Discharge Plan       Row Name 01/04/24 1052       Plan    Plan Plan to return home in [place], can stay at daughter's home locally if need.    Plan Comments Plan to return home in [place], can stay at daughter's home locally if need.  DC Barriers:  IV Abxs, 1L O2.                 Expected Discharge Date and Time       Expected Discharge Date Expected Discharge Time    Jan 5, 2024               SANCHO Santos RN  SIPS/ICU   O: [phone]  C: [phone]  [email or web address]       Electronically signed by Frank Santos RN at 01/04/24 1055       Shila  Lisandro Broderick MD at 24 0919              [facility] MEDICINE SERVICE  DAILY PROGRESS NOTE    NAME: Lizz Powell  : 1945  MRN: [id number]      LOS: 2 days     PROVIDER OF SERVICE: Lisandro Fuchs MD    Chief Complaint: Bilateral pneumonia    Subjective:     Interval History:  History taken from: patient      Patient doing stable reported breathing is getting better.    Review of Systems:   Review of Systems   Respiratory:  Positive for cough and shortness of breath.        Objective:     Vital Signs  Temp:  [97.3 °F (36.3 °C)-98.1 °F (36.7 °C)] 98.1 °F (36.7 °C)  Heart Rate:  [71-85] 71  Resp:  [18-20] 18  BP: (133-156)/(56-78) 135/78  Flow (L/min):  [2] 2   Body mass index is 29.01 kg/m².    Physical Exam  Physical Exam  Vitals and nursing note reviewed.   Constitutional:       General: He is not in acute distress.     Appearance: He is well-developed. He is not diaphoretic.   HENT:      Head: Normocephalic and atraumatic.   Cardiovascular:      Rate and Rhythm: Normal rate.   Pulmonary:      Effort: Pulmonary effort is normal. No respiratory distress.      Breath sounds: No wheezing.   Abdominal:      General: There is no distension.      Palpations: Abdomen is soft.   Musculoskeletal:         General: Normal range of motion.   Skin:     General: Skin is warm and dry.   Neurological:      Mental Status: He is alert.      Cranial Nerves: No cranial nerve deficit.   Psychiatric:         Behavior: Behavior normal.         Thought Content: Thought content normal.         Judgment: Judgment normal.         Scheduled Meds   amLODIPine, 5 mg, Oral, Daily  atorvastatin, 10 mg, Oral, Daily  azithromycin, 500 mg, Intravenous, Q24H  budesonide, 0.5 mg, Nebulization, BID - RT  cefTRIAXone (ROCEPHIN) 2,000 mg in sodium chloride 0.9 % 100 mL IVPB, 2,000 mg, Intravenous, Q24H  ipratropium-albuterol, 3 mL, Nebulization, 4x Daily - RT  metoprolol succinate XL, 50 mg, Oral, Daily  senna-docusate sodium, 2 tablet,  Oral, BID  sodium chloride, 10 mL, Intravenous, Q12H       PRN Meds     senna-docusate sodium **AND** polyethylene glycol **AND** bisacodyl **AND** bisacodyl    Calcium Replacement - Follow Nurse / BPA Driven Protocol    Magnesium Standard Dose Replacement - Follow Nurse / BPA Driven Protocol    nitroglycerin    ondansetron ODT **OR** ondansetron    Phosphorus Replacement - Follow Nurse / BPA Driven Protocol    Potassium Replacement - Follow Nurse / BPA Driven Protocol    sodium chloride    sodium chloride   Infusions  lactated ringers, 75 mL/hr, Last Rate: 75 mL/hr (01/02/24 2362)          Diagnostic Data    Results from last 7 days   Lab Units 01/04/24  0048 01/03/24  0106 01/02/24  1541   WBC 10*3/mm3 20.40*   < > 29.50*   HEMOGLOBIN g/dL 12.6*   < > 15.4   HEMATOCRIT % 38.7   < > 48.4   PLATELETS 10*3/mm3 371   < > 413   GLUCOSE mg/dL 102*   < > 118*   CREATININE mg/dL 0.73*   < > 0.97   BUN mg/dL 19   < > 21   SODIUM mmol/L 140   < > 137   POTASSIUM mmol/L 3.8   < > 3.4*   AST (SGOT) U/L  --   --  37   ALT (SGPT) U/L  --   --  56*   ALK PHOS U/L  --   --  273*   BILIRUBIN mg/dL  --   --  0.7   ANION GAP mmol/L 10.0   < > 7.8    < > = values in this interval not displayed.       CT Angiogram Chest Pulmonary Embolism    Result Date: 1/3/2024  Impression: 1.No evidence of pulmonary embolism. 2.There is diffuse peribronchial thickening with segmentally obstructive endobronchial debris and extensive tree-in-bud nodules scattered throughout both lungs. Findings are concerning for pneumonia and infectious bronchiolitis 3.Mild coronary artery calcification. Electronically Signed: Danilo Alexander MD  1/3/2024 1:56 AM EST  Workstation ID: [id number]    XR Chest 1 View    Result Date: 1/2/2024  Impression: 1. Bilateral perihilar airspace and interstitial opacities, left greater than right, suggesting atypical infection pattern or edema. Small left pleural effusion. 2. There is prominence of the right pulmonary hilum which  may be due to adenopathy. This may be reactive given the pulmonary findings. Attention on follow-up recommended to rule out neoplasm. Electronically Signed: Brock Ramon MD  1/2/2024 3:10 PM EST  Workstation ID: [id number]       I reviewed the patient's new clinical results.    Assessment/Plan:     Active and Resolved Problems  Active Hospital Problems    Diagnosis  POA    **Bilateral pneumonia [J18.9]  Yes      Resolved Hospital Problems   No resolved problems to display.     #Multi-focal Pneumonia  #Respiratory insufficiency    - Patient 87% on Room air at JR, placed on 2L NC    - CXR shows bilateral airspace opacities L>R and small left pleural effusion with adenopathy.    - CT PE     - covid/flu negative    - strep, legionella, sputum    - blood cultures    - Rocephin 2g IV daily    - Azithromycin 500mg IV x 3 days    - pulm consulted, suspect will need bronch to rule out neoplasm    - NPO    - LR @ 75/hr    - Lactate 1.5, monitor    - HS troponin 15 > 14, doubtful ACS    - proBNP 179.3, doubt fluid overload  Pulmonology also managing, we will continue to monitor continue to follow cultures.  Continue with IV antibiotics     #COPD    - Duoneb QID    - Pulmicort BID    - hold off IV steroids at this time  Continue with nebulizer treatments  Continue to wean oxygen as tolerated.     #Hypokalemia    - replace per protocol     #Tobacco use disorder    - cessation recommended      DVT prophylaxis:  Mechanical DVT prophylaxis orders are present.     Code status is   Code Status and Medical Interventions:   Ordered at: 01/02/24 2120     Code Status (Patient has no pulse and is not breathing):    CPR (Attempt to Resuscitate)     Medical Interventions (Patient has pulse or is breathing):    Full Support       Plan for disposition: Home in 1-2 days    Time: 30 minutes    Signature: Electronically signed by Lisandro Fuchs MD, 01/04/24, 09:20 EST.  [facility] Hospitalist Team     Electronically signed by Lisandro Fuchs  MD [place] at 24 0924       Arabella Wilson LPN at 24 0046          Goal Outcome Evaluation: Pt up in chair this evening, Reports feeling some better. Remains dependent on oxygen at this time. Remains on IV abx. Plan of care ongoing                          Electronically signed by Arabella Wilson LPN at 24 0049       La Ramos, PT at 24 1500          Goal Outcome Evaluation:  Plan of Care Reviewed With: patient, daughter        Progress: improving  Outcome Evaluation: Lizz Powell is a 77 y/o M admitted to [facility] on 24 for dyspnea on exertion. Chest X-ray shows bilat air space opacities consistent with multi-focal PNA. At baseline, pt lives by himself in [place], he is in town for the holidays visiting his daughter. Pt is typically indep with ADLs, mobility, and driving, and he denies use of AD. Pt reports he is already set up with pulmonary rehab in the future d/t having COPD. Pt currently is on 2L O2 via NC and was supervision with mobility. Pt able to ambulate in total 65 ft with O2 saturation WNL. Pt near baseline function and would benefit from continued pulmonary rehab upon d/c, other than that safe to mobilize through LOS and d/c home with his dtr. PT will sign off at this time, re-consult with any changes.      Anticipated Discharge Disposition (PT): home with assist    Electronically signed by La Ramos, PT at 24 1500       La Ramos, PT at 24 1500          Patient Name: Lizz Powell  : 1945    MRN: [id number]                              Today's Date: 1/3/2024       Admit Date: 2024    Visit Dx:     ICD-10-CM ICD-9-CM   1. COPD with pneumonia  J44.0 496    J18.9 486     Patient Active Problem List   Diagnosis    Bilateral pneumonia     Past Medical History:   Diagnosis Date    COPD (chronic obstructive pulmonary disease)     Hyperlipidemia     Hypertension      History reviewed. No pertinent surgical history.   General Information       Row Name  01/03/24 1455          Physical Therapy Time and Intention    Document Type evaluation  -OD     Mode of Treatment physical therapy  -OD       Row Name 01/03/24 1455          General Information    Patient Profile Reviewed yes  -OD     Prior Level of Function independent:  -OD     Barriers to Rehab none identified  -OD       Row Name 01/03/24 1455          Living Environment    People in Home alone  -OD       Row Name 01/03/24 1455          Cognition    Orientation Status (Cognition) oriented x 4  -OD       Row Name 01/03/24 1455          Safety Issues, Functional Mobility    Safety Issues Affecting Function (Mobility) insight into deficits/self-awareness  -OD     Impairments Affecting Function (Mobility) endurance/activity tolerance  -OD               User Key  (r) = Recorded By, (t) = Taken By, (c) = Cosigned By      Initials Name Provider Type    OD La Ramos PT Physical Therapist                   Mobility       Row Name 01/03/24 1455          Bed Mobility    Bed Mobility bed mobility (all) activities  -OD     All Activities, [place] (Bed Mobility) modified independence  -OD     Assistive Device (Bed Mobility) bed rails  -OD       Row Name 01/03/24 1455          Bed-Chair Transfer    Bed-Chair [place] (Transfers) supervision  -OD       Row Name 01/03/24 1455          Sit-Stand Transfer    Sit-Stand [place] (Transfers) supervision  -OD       Row Name 01/03/24 1455          Gait/Stairs (Locomotion)    [place] Level (Gait) supervision  -OD     Distance in Feet (Gait) 1 x 15 ft, 1 x 50 ft  -OD     Deviations/Abnormal Patterns (Gait) gait speed decreased  -OD               User Key  (r) = Recorded By, (t) = Taken By, (c) = Cosigned By      Initials Name Provider Type    OD La Ramos PT Physical Therapist                   Obj/Interventions       Row Name 01/03/24 1456          Range of Motion Comprehensive    General Range of Motion no range of motion deficits identified  -OD       Row  Name 01/03/24 1456          Strength Comprehensive (MMT)    General Manual Muscle Testing (MMT) Assessment no strength deficits identified  -OD       Row Name 01/03/24 1456          Balance    Balance Interventions sitting;standing;minimal challenge  -OD       Row Name 01/03/24 1456          Sensory Assessment (Somatosensory)    Sensory Assessment (Somatosensory) sensation intact  -OD               User Key  (r) = Recorded By, (t) = Taken By, (c) = Cosigned By      Initials Name Provider Type    OD La Ramos, PT Physical Therapist                   Goals/Plan    No documentation.                  Clinical Impression       Row Name 01/03/24 1456          Pain    Pretreatment Pain Rating 0/10 - no pain  -OD     Posttreatment Pain Rating 0/10 - no pain  -OD       Row Name 01/03/24 1456          Plan of Care Review    Plan of Care Reviewed With patient;daughter  -OD     Progress improving  -OD     Outcome Evaluation Lizz Powell is a 77 y/o M admitted to [facility] on 1/2/24 for dyspnea on exertion. Chest X-ray shows bilat air space opacities consistent with multi-focal PNA. At baseline, pt lives by himself in [place], he is in town for the holidays visiting his daughter. Pt is typically indep with ADLs, mobility, and driving, and he denies use of AD. Pt reports he is already set up with pulmonary rehab in the future d/t having COPD. Pt currently is on 2L O2 via NC and was supervision with mobility. Pt able to ambulate in total 65 ft with O2 saturation WNL. Pt near baseline function and would benefit from continued pulmonary rehab upon d/c, other than that safe to mobilize through LOS and d/c home with his dtr. PT will sign off at this time, re-consult with any changes.  -OD       Row Name 01/03/24 1456          Therapy Assessment/Plan (PT)    Criteria for Skilled Interventions Met (PT) no;no problems identified which require skilled intervention  -OD     Therapy Frequency (PT) evaluation only  -OD       Row Name 01/03/24  1456          Vital Signs    Pre SpO2 (%) 94  -OD     O2 Delivery Pre Treatment nasal cannula  2L  -OD     Intra SpO2 (%) 93  -OD     O2 Delivery Intra Treatment nasal cannula  -OD     Post SpO2 (%) 96  -OD     O2 Delivery Post Treatment nasal cannula  -OD     Pre Patient Position Supine  -OD     Intra Patient Position Standing  -OD     Post Patient Position Sitting  -OD       Row Name 01/03/24 1456          Positioning and Restraints    Pre-Treatment Position in bed  -OD     Post Treatment Position chair  -OD     In Chair notified nsg;with family/caregiver;reclined;call light within reach;encouraged to call for assist  -OD               User Key  (r) = Recorded By, (t) = Taken By, (c) = Cosigned By      Initials Name Provider Type    [place] PT Physical Therapist                   Outcome Measures       Row Name 01/03/24 1459 01/03/24 0800       How much help from another person do you currently need...    Turning from your back to your side while in flat bed without using bedrails? 4  -OD 4  -AY    Moving from lying on back to sitting on the side of a flat bed without bedrails? 4  -OD 4  -AY    Moving to and from a bed to a chair (including a wheelchair)? 4  -OD 4  -AY    Standing up from a chair using your arms (e.g., wheelchair, bedside chair)? 4  -OD 4  -AY    Climbing 3-5 steps with a railing? 4  -OD 4  -AY    To walk in hospital room? 4  -OD 4  -AY    AM-PAC 6 Clicks Score (PT) 24  -OD 24  -AY    Highest Level of Mobility Goal 8 --> Walked 250 feet or more  -OD 8 --> Walked 250 feet or more  -AY      Row Name 01/03/24 1459          Functional Assessment    Outcome Measure Options AM-PAC 6 Clicks Basic Mobility (PT)  -OD               User Key  (r) = Recorded By, (t) = Taken By, (c) = Cosigned By      Initials Name Provider Type    Chanel Lea, RN Registered Nurse    [place] PT Physical Therapist                                 Physical Therapy Education       Title: PT OT SLP  Therapies (Done)       Topic: Physical Therapy (Done)       Point: Mobility training (Done)       Learning Progress Summary             Patient Acceptance, E, VU by OD at 1/3/2024 1459                         Point: Home exercise program (Done)       Learning Progress Summary             Patient Acceptance, E, VU by OD at 1/3/2024 1459                         Point: Body mechanics (Done)       Learning Progress Summary             Patient Acceptance, E, VU by OD at 1/3/2024 1459                         Point: Precautions (Done)       Learning Progress Summary             Patient Acceptance, E, VU by OD at 1/3/2024 1459                                         User Key       Initials Effective Dates Name Provider Type Discipline    OD 05/11/23 -  La Ramos, PT Physical Therapist PT                  PT Recommendation and Plan     Plan of Care Reviewed With: patient, daughter  Progress: improving  Outcome Evaluation: Lizz Powell is a 79 y/o M admitted to [facility] on 1/2/24 for dyspnea on exertion. Chest X-ray shows bilat air space opacities consistent with multi-focal PNA. At baseline, pt lives by himself in [place], he is in town for the holidays visiting his daughter. Pt is typically indep with ADLs, mobility, and driving, and he denies use of AD. Pt reports he is already set up with pulmonary rehab in the future d/t having COPD. Pt currently is on 2L O2 via NC and was supervision with mobility. Pt able to ambulate in total 65 ft with O2 saturation WNL. Pt near baseline function and would benefit from continued pulmonary rehab upon d/c, other than that safe to mobilize through LOS and d/c home with his dtr. PT will sign off at this time, re-consult with any changes.     Time Calculation:         PT Charges       Row Name 01/03/24 1459             Time Calculation    Start Time 1338  -OD      Stop Time 1400  -OD      Time Calculation (min) 22 min  -OD      PT Received On 01/03/24  -OD         Time Calculation- PT     Total Timed Code Minutes- PT 0 minute(s)  -OD                User Key  (r) = Recorded By, (t) = Taken By, (c) = Cosigned By      Initials Name Provider Type    OD La Ramos, PT Physical Therapist                  Therapy Charges for Today       Code Description Service Date Service Provider Modifiers Qty    [id number] HC PT EVAL LOW COMPLEXITY 4 1/3/2024 La Ramos, PT GP 1            PT G-Codes  Outcome Measure Options: AM-PAC 6 Clicks Basic Mobility (PT)  AM-PAC 6 Clicks Score (PT): 24  PT Discharge Summary  Anticipated Discharge Disposition (PT): home with assist    La Ramos PT  1/3/2024      Electronically signed by La Ramos, PT at 01/03/24 1500        To prevent performance issues, not all notes are shown.    Go to Chart Review to see the rest of the notes.     Oxygen Therapy (last 2 days)       Date/Time SpO2 Device (Oxygen Therapy) Flow (L/min) Oxygen Concentration (%) ETCO2 (mmHg)    01/08/24 1235 93 nasal cannula 2 -- --    01/08/24 1218 95 nasal cannula 2 -- --    01/08/24 1217 -- nasal cannula 2 -- --    01/08/24 0853 -- nasal cannula 2 -- --    01/08/24 0453 98 nasal cannula 2 -- --    01/07/24 2104 93 nasal cannula 2 -- --    01/07/24 2000 -- nasal cannula 3 -- --    01/07/24 1853 97 nasal cannula 2 -- --    01/07/24 1850 97 nasal cannula 2  -- --    01/07/24 1236 97 nasal cannula 3 -- --    01/07/24 0851 94 nasal cannula 3 -- --    01/07/24 0845 93 nasal cannula 3 -- --    01/07/24 0820 -- nasal cannula 3 -- --    01/07/24 0310 95 nasal cannula 3 -- --    01/07/24 0009 -- nasal cannula 3 -- --    01/06/24 2046 94 nasal cannula 3 -- --    01/06/24 2040 94 nasal cannula 3 -- --    01/06/24 2039 94 nasal cannula 3 -- --    01/06/24 2035 94 nasal cannula 3 -- --    01/06/24 2029 94 -- -- -- --    01/06/24 1915 -- nasal cannula 3 -- --    01/06/24 1218 95 nasal cannula 3 -- --    01/06/24 0856 -- nasal cannula 3 -- --    01/06/24 0842 96 nasal cannula 4 -- --    01/06/24 0820 100 nasal  cannula 4 -- --    01/06/24 0817 99 nasal cannula 4 -- --    01/06/24 0815 99 nasal cannula 4 -- --    01/06/24 0812 94 nasal cannula 4 -- --    01/06/24 0500 94 -- -- -- --    01/06/24 0400 -- nasal cannula 4 -- --    01/06/24 0056 -- nasal cannula 4 -- --    01/06/24 0052 93 -- -- -- --          Respiratory Therapy (last 24 hours)       Respiratory Therapy Flowsheet       Row Name 01/08/24 1235 01/08/24 1218 01/08/24 1217 01/08/24 0853 01/08/24 0600       $ Oximetry Charges    $ O2 Pt/System Assessment yes -- -- -- --    $ Walking Oximetry yes -- -- -- --       Oxygen Therapy    SpO2 93 % 95 % -- -- --    Pulse Oximetry Type Continuous Continuous -- -- --    Device (Oxygen Therapy) nasal cannula nasal cannula nasal cannula nasal cannula --    Daily Review of Necessity (O2 Therapy) completed -- -- -- --    Flow (L/min) 2 2 2 2 --       Vital Signs    Temp -- 97.2 °F (36.2 °C) -- -- --    Temp src -- Oral -- -- --    Pulse 83 73 -- -- --    Heart Rate Source Monitor Monitor -- -- --    Resp 17 19 -- -- --    Resp Rate Source Visual Visual -- -- --    BP -- 135/72 -- -- --    BP Location -- Right arm -- -- --    BP Method -- Automatic -- -- --    Patient Position -- Sitting -- -- --       Assessment    Respiratory WDL -- -- -- .WDL except;breath sounds;cough;rhythm/pattern --    Rhythm/Pattern, Respiratory -- -- -- depth regular;pattern regular;no shortness of breath reported;labored --    Expansion/Accessory Muscles/Retractions -- -- -- abdominal muscle use --    Cough Frequency -- -- -- infrequent --    Cough Type -- -- -- congested;nonproductive --    Airway Safety Measures -- -- -- -- oxygen flowmeter at bedside    Skin Integrity -- -- -- bruised (ecchymotic) --       Breath Sounds    Breath Sounds -- -- -- HELEN;LLL;RUL;RML;RLL --    All Lung Fields Breath Sounds -- -- -- Posterior:;Lateral: --    HELEN Breath Sounds -- -- -- coarse;wheezes, inspiratory --    LLL Breath Sounds -- -- -- coarse --    RUL Breath Sounds  -- -- -- coarse --    RML Breath Sounds -- -- -- coarse --    RLL Breath Sounds -- -- -- coarse;wheezes, inspiratory --       Treatment/Therapy    Cough And Deep Breathing -- -- -- done independently per patient --      Row Name 01/08/24 0453 01/08/24 0400 01/08/24 0200 01/08/24 0000 01/07/24 2203       Oxygen Therapy    SpO2 98 % -- -- -- --    Device (Oxygen Therapy) nasal cannula -- -- -- --    Flow (L/min) 2 -- -- -- --    SF Ratio -- -- -- -- 243       Vital Signs    Temp 97.5 °F (36.4 °C) -- -- -- --    Temp src Oral -- -- -- --    Pulse 76 -- -- -- --    Heart Rate Source Monitor -- -- -- --    Resp 18 -- -- -- --    Resp Rate Source Visual -- -- -- --    /75 -- -- -- --    BP Location Right arm -- -- -- --    BP Method Automatic -- -- -- --    Patient Position Lying -- -- -- --       Assessment    Airway Safety Measures -- oxygen flowmeter at bedside oxygen flowmeter at bedside oxygen flowmeter at bedside --      Row Name 01/07/24 2200 01/07/24 2104 01/07/24 2000 01/07/24 1853 01/07/24 1850       $ Oximetry Charges    $ O2 Pt/System Assessment -- -- -- yes yes       Oxygen Therapy    SpO2 -- 93 % -- 97 % 97 %    Pulse Oximetry Type -- -- -- Continuous Continuous    Device (Oxygen Therapy) -- nasal cannula nasal cannula nasal cannula nasal cannula    Flow (L/min) -- 2 3 2 2   titrated per sats       Vital Signs    Temp -- 97.4 °F (36.3 °C) -- -- --    Temp src -- Oral -- -- --    Pulse -- 91 -- 90 90    Heart Rate Source -- Monitor -- Monitor Monitor    Resp -- 17 -- 18 18    Resp Rate Source -- Visual -- Visual Visual    BP -- 160/70 -- -- --    BP Location -- Right arm -- -- --    BP Method -- Automatic -- -- --    Patient Position -- Sitting -- -- --       Assessment    Respiratory WDL -- -- .WDL except;breath sounds;cough;rhythm/pattern;effort/expansion -- .WDL except;breath sounds    Rhythm/Pattern, Respiratory -- -- shortness of breath;pursed lip breathing;pattern regular;labored --  unlabored;pattern regular;depth regular;no shortness of breath reported    Expansion/Accessory Muscles/Retractions -- -- abdominal muscle use -- --    Chest Appearance -- -- barrel chest -- --    Nailbeds -- -- no discoloration -- --    Mucous Membranes -- -- intact;moist -- --    Cough Frequency -- -- infrequent -- --    Cough Type -- -- congested -- --    Airway Safety Measures oxygen flowmeter at bedside -- oxygen flowmeter at bedside -- --    Skin Integrity -- -- bruised (ecchymotic) -- --       Breath Sounds    Breath Sounds -- -- All Fields -- All Fields    All Lung Fields Breath Sounds -- -- Anterior:;diminished -- diminished;rhonchi       Breath Sounds Post-Respiratory Treatment    Breath Sounds Posttreatment All Fields -- -- -- All Fields --    Breath Sounds Posttreatment All Fields -- -- -- no change --       Treatment/Therapy    Administration (IS) -- -- self-administered -- --       Aerosol Therapy (SVN)    $ Mini Neb Subsequent -- -- -- -- yes    Respiratory Treatment Status (SVN) -- -- -- -- given    Treatment Route (SVN) -- -- -- -- mouthpiece utilized    Patient Position -- -- -- -- sitting in chair    Posttreatment Assessment (SVN) -- -- -- breath sounds unchanged --    Signs of Intolerance (SVN) -- -- -- none --      Row Name 01/07/24 1404                   Oxygen Therapy    SF Ratio 344

## 2024-01-09 NOTE — CASE MANAGEMENT/SOCIAL WORK
Case Management Discharge Note      Final Note: Home         Selected Continued Care - Discharged on 1/8/2024 Admission date: 1/2/2024 - Discharge disposition: Home or Self Care        Durable Medical Equipment       Service Provider Selected Services Address Phone Fax Patient Preferred    [facility] [address], [place] [address] [phone] --               Transportation Services  Private: Car    Final Discharge Disposition Code: 01 - home or self-care    SANCHO Santos RN  SIPS/ICU   O: [phone]  C: [phone]  [email or web address]

## 2024-01-09 NOTE — OUTREACH NOTE
Prep Survey      Flowsheet Row Responses   [facility] facility patient discharged from? [facility]   Is LACE score < 7 ? No   Eligibility Not Eligible   What are the reasons patient is not eligible? Other  [return home in [place]]   Does the patient have one of the following disease processes/diagnoses(primary or secondary)? Other   Prep survey completed? Yes            Edith VALENTE - Registered Nurse

## 2024-01-10 NOTE — PROGRESS NOTES
Enter Query Response Below      Query Response: Bacterial pneumonia unspecified             If applicable, please update the problem list.     Patient: Lizz Powell        : 1945  Account: [id number]           Admit Date: 2024        How to Respond to this query:       a. Click New Note     b. Answer query within the yellow box.                c. Update the Problem List, if applicable.      If you have any questions about this query contact me at: [email or web address]     Dr. Hsu:    78-year-old patient admitted  with Community acquired pneumonia, COPD exacerbation, and acute hypoxic respiratory failure.  Bronchoscopy  noted multiple mucous plugs.  Treatment included supplemental oxygen, IV azithromycin x 3 days and Rocephin x 6 days.     Please clarify the type of pneumonia the patient was treated/monitored for:    Gram negative pneumonia (excluding Haemophilus influenzae)  Bacterial pneumonia unspecified  Other- specify______  Unable to determine    By submitting this query, we are merely seeking further clarification of documentation to accurately reflect all conditions that you are monitoring, evaluating, treating or that extend the hospitalization or utilize additional resources of care. Please utilize your independent clinical judgment when addressing the question(s) above.     This query and your response, once completed, will be entered into the legal medical record.    Sincerely,  Simran Fontaine RN, CCDS  Clinical Documentation Integrity Program

## 2024-01-11 LAB — FUNGUS WND CULT: ABNORMAL

## 2024-01-12 LAB
MYCOBACTERIUM SPEC CULT: NORMAL
NIGHT BLUE STAIN TISS: NORMAL

## 2024-01-15 LAB — VIRUS SPEC CULT: NORMAL

## 2024-01-19 LAB
MYCOBACTERIUM SPEC CULT: NORMAL
NIGHT BLUE STAIN TISS: NORMAL

## 2024-01-26 LAB
MYCOBACTERIUM SPEC CULT: NORMAL
NIGHT BLUE STAIN TISS: NORMAL

## 2024-02-01 LAB — FUNGUS WND CULT: ABNORMAL

## 2024-02-02 LAB
MYCOBACTERIUM SPEC CULT: NORMAL
NIGHT BLUE STAIN TISS: NORMAL

## 2024-02-09 LAB
MYCOBACTERIUM SPEC CULT: NORMAL
NIGHT BLUE STAIN TISS: NORMAL

## 2024-02-16 LAB
MYCOBACTERIUM SPEC CULT: NORMAL
NIGHT BLUE STAIN TISS: NORMAL

## (undated) DEVICE — BAPTIST FLOYD BRONCHOSCOPY: Brand: MEDLINE INDUSTRIES, INC.